# Patient Record
Sex: MALE | Race: WHITE | NOT HISPANIC OR LATINO | Employment: FULL TIME | URBAN - METROPOLITAN AREA
[De-identification: names, ages, dates, MRNs, and addresses within clinical notes are randomized per-mention and may not be internally consistent; named-entity substitution may affect disease eponyms.]

---

## 2024-09-09 ENCOUNTER — HOSPITAL ENCOUNTER (INPATIENT)
Facility: HOSPITAL | Age: 39
End: 2024-09-09
Admitting: SURGERY
Payer: MEDICARE

## 2024-09-09 ENCOUNTER — APPOINTMENT (OUTPATIENT)
Dept: RADIOLOGY | Facility: HOSPITAL | Age: 39
End: 2024-09-09
Payer: MEDICARE

## 2024-09-09 DIAGNOSIS — R10.84 GENERALIZED ABDOMINAL PAIN: Primary | ICD-10-CM

## 2024-09-09 DIAGNOSIS — K43.6 VENTRAL HERNIA WITH OBSTRUCTION AND WITHOUT GANGRENE: ICD-10-CM

## 2024-09-09 DIAGNOSIS — K56.609 SMALL BOWEL OBSTRUCTION (MULTI): ICD-10-CM

## 2024-09-09 LAB
ALBUMIN SERPL-MCNC: 5.1 G/DL (ref 3.5–5)
ALP BLD-CCNC: 134 U/L (ref 35–125)
ALT SERPL-CCNC: 59 U/L (ref 5–40)
ANION GAP SERPL CALC-SCNC: 18 MMOL/L
AST SERPL-CCNC: 53 U/L (ref 5–40)
BASOPHILS # BLD AUTO: 0.06 X10*3/UL (ref 0–0.1)
BASOPHILS NFR BLD AUTO: 0.4 %
BILIRUB SERPL-MCNC: 0.9 MG/DL (ref 0.1–1.2)
BUN SERPL-MCNC: 21 MG/DL (ref 8–25)
CALCIUM SERPL-MCNC: 11.1 MG/DL (ref 8.5–10.4)
CHLORIDE SERPL-SCNC: 92 MMOL/L (ref 97–107)
CO2 SERPL-SCNC: 26 MMOL/L (ref 24–31)
CREAT SERPL-MCNC: 1.7 MG/DL (ref 0.4–1.6)
EGFRCR SERPLBLD CKD-EPI 2021: 52 ML/MIN/1.73M*2
EOSINOPHIL # BLD AUTO: 0.01 X10*3/UL (ref 0–0.7)
EOSINOPHIL NFR BLD AUTO: 0.1 %
ERYTHROCYTE [DISTWIDTH] IN BLOOD BY AUTOMATED COUNT: 12.3 % (ref 11.5–14.5)
GLUCOSE SERPL-MCNC: 178 MG/DL (ref 65–99)
HCT VFR BLD AUTO: 52 % (ref 41–52)
HGB BLD-MCNC: 17.7 G/DL (ref 13.5–17.5)
IMM GRANULOCYTES # BLD AUTO: 0.04 X10*3/UL (ref 0–0.7)
IMM GRANULOCYTES NFR BLD AUTO: 0.3 % (ref 0–0.9)
LACTATE BLDV-SCNC: 1.8 MMOL/L (ref 0.4–2)
LACTATE BLDV-SCNC: 3 MMOL/L (ref 0.4–2)
LIPASE SERPL-CCNC: 40 U/L (ref 16–63)
LYMPHOCYTES # BLD AUTO: 1 X10*3/UL (ref 1.2–4.8)
LYMPHOCYTES NFR BLD AUTO: 7.3 %
MCH RBC QN AUTO: 29.4 PG (ref 26–34)
MCHC RBC AUTO-ENTMCNC: 34 G/DL (ref 32–36)
MCV RBC AUTO: 86 FL (ref 80–100)
MONOCYTES # BLD AUTO: 0.91 X10*3/UL (ref 0.1–1)
MONOCYTES NFR BLD AUTO: 6.6 %
NEUTROPHILS # BLD AUTO: 11.75 X10*3/UL (ref 1.2–7.7)
NEUTROPHILS NFR BLD AUTO: 85.3 %
NRBC BLD-RTO: 0 /100 WBCS (ref 0–0)
PLATELET # BLD AUTO: 306 X10*3/UL (ref 150–450)
POTASSIUM SERPL-SCNC: 4.5 MMOL/L (ref 3.4–5.1)
PROT SERPL-MCNC: 10.2 G/DL (ref 5.9–7.9)
RBC # BLD AUTO: 6.02 X10*6/UL (ref 4.5–5.9)
SODIUM SERPL-SCNC: 136 MMOL/L (ref 133–145)
WBC # BLD AUTO: 13.8 X10*3/UL (ref 4.4–11.3)

## 2024-09-09 PROCEDURE — 2500000004 HC RX 250 GENERAL PHARMACY W/ HCPCS (ALT 636 FOR OP/ED): Performed by: PHYSICIAN ASSISTANT

## 2024-09-09 PROCEDURE — 83605 ASSAY OF LACTIC ACID: CPT | Performed by: PHYSICIAN ASSISTANT

## 2024-09-09 PROCEDURE — 85025 COMPLETE CBC W/AUTO DIFF WBC: CPT | Performed by: PHYSICIAN ASSISTANT

## 2024-09-09 PROCEDURE — 80053 COMPREHEN METABOLIC PANEL: CPT | Performed by: PHYSICIAN ASSISTANT

## 2024-09-09 PROCEDURE — 36415 COLL VENOUS BLD VENIPUNCTURE: CPT | Performed by: PHYSICIAN ASSISTANT

## 2024-09-09 PROCEDURE — 96374 THER/PROPH/DIAG INJ IV PUSH: CPT

## 2024-09-09 PROCEDURE — 99285 EMERGENCY DEPT VISIT HI MDM: CPT | Mod: 25

## 2024-09-09 PROCEDURE — 74177 CT ABD & PELVIS W/CONTRAST: CPT | Performed by: RADIOLOGY

## 2024-09-09 PROCEDURE — 74177 CT ABD & PELVIS W/CONTRAST: CPT

## 2024-09-09 PROCEDURE — 96361 HYDRATE IV INFUSION ADD-ON: CPT

## 2024-09-09 PROCEDURE — 96375 TX/PRO/DX INJ NEW DRUG ADDON: CPT

## 2024-09-09 PROCEDURE — 2550000001 HC RX 255 CONTRASTS

## 2024-09-09 PROCEDURE — 83690 ASSAY OF LIPASE: CPT | Performed by: PHYSICIAN ASSISTANT

## 2024-09-09 RX ORDER — ONDANSETRON HYDROCHLORIDE 2 MG/ML
4 INJECTION, SOLUTION INTRAVENOUS ONCE
Status: COMPLETED | OUTPATIENT
Start: 2024-09-09 | End: 2024-09-09

## 2024-09-09 RX ORDER — MORPHINE SULFATE 4 MG/ML
4 INJECTION, SOLUTION INTRAMUSCULAR; INTRAVENOUS ONCE
Status: COMPLETED | OUTPATIENT
Start: 2024-09-09 | End: 2024-09-09

## 2024-09-09 ASSESSMENT — LIFESTYLE VARIABLES
TOTAL SCORE: 0
HAVE PEOPLE ANNOYED YOU BY CRITICIZING YOUR DRINKING: NO
EVER HAD A DRINK FIRST THING IN THE MORNING TO STEADY YOUR NERVES TO GET RID OF A HANGOVER: NO
EVER FELT BAD OR GUILTY ABOUT YOUR DRINKING: NO
HAVE YOU EVER FELT YOU SHOULD CUT DOWN ON YOUR DRINKING: NO

## 2024-09-09 ASSESSMENT — PAIN - FUNCTIONAL ASSESSMENT: PAIN_FUNCTIONAL_ASSESSMENT: 0-10

## 2024-09-09 ASSESSMENT — PAIN SCALES - GENERAL
PAINLEVEL_OUTOF10: 8
PAINLEVEL_OUTOF10: 7

## 2024-09-09 ASSESSMENT — PAIN DESCRIPTION - LOCATION: LOCATION: ABDOMEN

## 2024-09-09 ASSESSMENT — COLUMBIA-SUICIDE SEVERITY RATING SCALE - C-SSRS
2. HAVE YOU ACTUALLY HAD ANY THOUGHTS OF KILLING YOURSELF?: NO
1. IN THE PAST MONTH, HAVE YOU WISHED YOU WERE DEAD OR WISHED YOU COULD GO TO SLEEP AND NOT WAKE UP?: NO
6. HAVE YOU EVER DONE ANYTHING, STARTED TO DO ANYTHING, OR PREPARED TO DO ANYTHING TO END YOUR LIFE?: NO

## 2024-09-09 ASSESSMENT — PAIN DESCRIPTION - PAIN TYPE: TYPE: ACUTE PAIN

## 2024-09-10 ENCOUNTER — APPOINTMENT (OUTPATIENT)
Dept: RADIOLOGY | Facility: HOSPITAL | Age: 39
End: 2024-09-10
Payer: MEDICARE

## 2024-09-10 PROBLEM — R10.84 GENERALIZED ABDOMINAL PAIN: Status: ACTIVE | Noted: 2024-09-09

## 2024-09-10 PROBLEM — K56.609 SMALL BOWEL OBSTRUCTION (MULTI): Status: ACTIVE | Noted: 2024-09-10

## 2024-09-10 PROBLEM — K43.6 VENTRAL HERNIA WITH OBSTRUCTION AND WITHOUT GANGRENE: Status: ACTIVE | Noted: 2024-09-09

## 2024-09-10 LAB
APPEARANCE UR: ABNORMAL
BILIRUB UR STRIP.AUTO-MCNC: NEGATIVE MG/DL
COLOR UR: YELLOW
GLUCOSE UR STRIP.AUTO-MCNC: NORMAL MG/DL
HOLD SPECIMEN: NORMAL
KETONES UR STRIP.AUTO-MCNC: NEGATIVE MG/DL
LEUKOCYTE ESTERASE UR QL STRIP.AUTO: NEGATIVE
NITRITE UR QL STRIP.AUTO: NEGATIVE
PH UR STRIP.AUTO: 6 [PH]
PROT UR STRIP.AUTO-MCNC: ABNORMAL MG/DL
RBC # UR STRIP.AUTO: NEGATIVE /UL
RBC #/AREA URNS AUTO: NORMAL /HPF
SP GR UR STRIP.AUTO: >1.05
UROBILINOGEN UR STRIP.AUTO-MCNC: NORMAL MG/DL
WBC #/AREA URNS AUTO: NORMAL /HPF

## 2024-09-10 PROCEDURE — 74018 RADEX ABDOMEN 1 VIEW: CPT

## 2024-09-10 PROCEDURE — 76705 ECHO EXAM OF ABDOMEN: CPT | Performed by: STUDENT IN AN ORGANIZED HEALTH CARE EDUCATION/TRAINING PROGRAM

## 2024-09-10 PROCEDURE — G0378 HOSPITAL OBSERVATION PER HR: HCPCS

## 2024-09-10 PROCEDURE — 76705 ECHO EXAM OF ABDOMEN: CPT

## 2024-09-10 PROCEDURE — 2500000004 HC RX 250 GENERAL PHARMACY W/ HCPCS (ALT 636 FOR OP/ED)

## 2024-09-10 PROCEDURE — 74018 RADEX ABDOMEN 1 VIEW: CPT | Performed by: RADIOLOGY

## 2024-09-10 PROCEDURE — 99223 1ST HOSP IP/OBS HIGH 75: CPT

## 2024-09-10 PROCEDURE — 96361 HYDRATE IV INFUSION ADD-ON: CPT

## 2024-09-10 PROCEDURE — 81001 URINALYSIS AUTO W/SCOPE: CPT | Performed by: PHYSICIAN ASSISTANT

## 2024-09-10 RX ORDER — IBUPROFEN 200 MG
200 TABLET ORAL EVERY 6 HOURS PRN
COMMUNITY

## 2024-09-10 RX ORDER — HYDROMORPHONE HYDROCHLORIDE 1 MG/ML
1 INJECTION, SOLUTION INTRAMUSCULAR; INTRAVENOUS; SUBCUTANEOUS
Status: DISCONTINUED | OUTPATIENT
Start: 2024-09-10 | End: 2024-09-15

## 2024-09-10 RX ORDER — SODIUM CHLORIDE 9 MG/ML
100 INJECTION, SOLUTION INTRAVENOUS CONTINUOUS
Status: DISCONTINUED | OUTPATIENT
Start: 2024-09-10 | End: 2024-09-10

## 2024-09-10 RX ORDER — ONDANSETRON HYDROCHLORIDE 2 MG/ML
4 INJECTION, SOLUTION INTRAVENOUS EVERY 6 HOURS PRN
Status: DISCONTINUED | OUTPATIENT
Start: 2024-09-10 | End: 2024-09-20 | Stop reason: HOSPADM

## 2024-09-10 RX ORDER — HYDROMORPHONE HYDROCHLORIDE 1 MG/ML
0.6 INJECTION, SOLUTION INTRAMUSCULAR; INTRAVENOUS; SUBCUTANEOUS
Status: DISCONTINUED | OUTPATIENT
Start: 2024-09-10 | End: 2024-09-15

## 2024-09-10 RX ORDER — GLUCOSAM/CHONDRO/HERB 149/HYAL 750-100 MG
1 TABLET ORAL DAILY
COMMUNITY

## 2024-09-10 RX ORDER — SODIUM CHLORIDE 450 MG/100ML
100 INJECTION, SOLUTION INTRAVENOUS CONTINUOUS
Status: DISCONTINUED | OUTPATIENT
Start: 2024-09-10 | End: 2024-09-12

## 2024-09-10 SDOH — SOCIAL STABILITY: SOCIAL INSECURITY: HAS ANYONE EVER THREATENED TO HURT YOUR FAMILY OR YOUR PETS?: NO

## 2024-09-10 SDOH — ECONOMIC STABILITY: INCOME INSECURITY: IN THE LAST 12 MONTHS, WAS THERE A TIME WHEN YOU WERE NOT ABLE TO PAY THE MORTGAGE OR RENT ON TIME?: NO

## 2024-09-10 SDOH — ECONOMIC STABILITY: HOUSING INSECURITY: IN THE PAST 12 MONTHS, HOW MANY TIMES HAVE YOU MOVED WHERE YOU WERE LIVING?: 0

## 2024-09-10 SDOH — SOCIAL STABILITY: SOCIAL INSECURITY: ARE YOU OR HAVE YOU BEEN THREATENED OR ABUSED PHYSICALLY, EMOTIONALLY, OR SEXUALLY BY ANYONE?: NO

## 2024-09-10 SDOH — ECONOMIC STABILITY: HOUSING INSECURITY: AT ANY TIME IN THE PAST 12 MONTHS, WERE YOU HOMELESS OR LIVING IN A SHELTER (INCLUDING NOW)?: NO

## 2024-09-10 SDOH — SOCIAL STABILITY: SOCIAL INSECURITY: ARE THERE ANY APPARENT SIGNS OF INJURIES/BEHAVIORS THAT COULD BE RELATED TO ABUSE/NEGLECT?: NO

## 2024-09-10 SDOH — SOCIAL STABILITY: SOCIAL INSECURITY: HAVE YOU HAD ANY THOUGHTS OF HARMING ANYONE ELSE?: NO

## 2024-09-10 SDOH — ECONOMIC STABILITY: INCOME INSECURITY: HOW HARD IS IT FOR YOU TO PAY FOR THE VERY BASICS LIKE FOOD, HOUSING, MEDICAL CARE, AND HEATING?: SOMEWHAT HARD

## 2024-09-10 SDOH — SOCIAL STABILITY: SOCIAL INSECURITY: DOES ANYONE TRY TO KEEP YOU FROM HAVING/CONTACTING OTHER FRIENDS OR DOING THINGS OUTSIDE YOUR HOME?: NO

## 2024-09-10 SDOH — ECONOMIC STABILITY: TRANSPORTATION INSECURITY
IN THE PAST 12 MONTHS, HAS LACK OF TRANSPORTATION KEPT YOU FROM MEETINGS, WORK, OR FROM GETTING THINGS NEEDED FOR DAILY LIVING?: NO

## 2024-09-10 SDOH — ECONOMIC STABILITY: TRANSPORTATION INSECURITY
IN THE PAST 12 MONTHS, HAS THE LACK OF TRANSPORTATION KEPT YOU FROM MEDICAL APPOINTMENTS OR FROM GETTING MEDICATIONS?: NO

## 2024-09-10 SDOH — SOCIAL STABILITY: SOCIAL INSECURITY: WERE YOU ABLE TO COMPLETE ALL THE BEHAVIORAL HEALTH SCREENINGS?: YES

## 2024-09-10 SDOH — SOCIAL STABILITY: SOCIAL INSECURITY: ABUSE: ADULT

## 2024-09-10 SDOH — SOCIAL STABILITY: SOCIAL INSECURITY: HAVE YOU HAD THOUGHTS OF HARMING ANYONE ELSE?: NO

## 2024-09-10 SDOH — SOCIAL STABILITY: SOCIAL INSECURITY: DO YOU FEEL UNSAFE GOING BACK TO THE PLACE WHERE YOU ARE LIVING?: NO

## 2024-09-10 SDOH — SOCIAL STABILITY: SOCIAL INSECURITY: DO YOU FEEL ANYONE HAS EXPLOITED OR TAKEN ADVANTAGE OF YOU FINANCIALLY OR OF YOUR PERSONAL PROPERTY?: NO

## 2024-09-10 ASSESSMENT — LIFESTYLE VARIABLES
HOW OFTEN DO YOU HAVE 6 OR MORE DRINKS ON ONE OCCASION: NEVER
PRESCIPTION_ABUSE_PAST_12_MONTHS: NO
SKIP TO QUESTIONS 9-10: 1
SUBSTANCE_ABUSE_PAST_12_MONTHS: NO
HOW OFTEN DO YOU HAVE A DRINK CONTAINING ALCOHOL: NEVER
HOW MANY STANDARD DRINKS CONTAINING ALCOHOL DO YOU HAVE ON A TYPICAL DAY: PATIENT DOES NOT DRINK
AUDIT-C TOTAL SCORE: 0
AUDIT-C TOTAL SCORE: 0

## 2024-09-10 ASSESSMENT — COGNITIVE AND FUNCTIONAL STATUS - GENERAL
PATIENT BASELINE BEDBOUND: NO
MOBILITY SCORE: 24
DAILY ACTIVITIY SCORE: 24

## 2024-09-10 ASSESSMENT — ENCOUNTER SYMPTOMS
HEMATOLOGIC/LYMPHATIC NEGATIVE: 1
MUSCULOSKELETAL NEGATIVE: 1
DIARRHEA: 0
FEVER: 0
ABDOMINAL DISTENTION: 1
CONSTIPATION: 0
NEUROLOGICAL NEGATIVE: 1
COUGH: 0
SHORTNESS OF BREATH: 0
VOMITING: 1
ANAL BLEEDING: 0
PSYCHIATRIC NEGATIVE: 1
BLOOD IN STOOL: 0
CHILLS: 0
NAUSEA: 1
ABDOMINAL PAIN: 1
RECTAL PAIN: 0

## 2024-09-10 ASSESSMENT — ACTIVITIES OF DAILY LIVING (ADL)
ADEQUATE_TO_COMPLETE_ADL: YES
PATIENT'S MEMORY ADEQUATE TO SAFELY COMPLETE DAILY ACTIVITIES?: YES
JUDGMENT_ADEQUATE_SAFELY_COMPLETE_DAILY_ACTIVITIES: YES
HEARING - LEFT EAR: FUNCTIONAL
DRESSING YOURSELF: INDEPENDENT
HEARING - RIGHT EAR: FUNCTIONAL
BATHING: INDEPENDENT
GROOMING: INDEPENDENT
FEEDING YOURSELF: INDEPENDENT
WALKS IN HOME: INDEPENDENT
TOILETING: INDEPENDENT

## 2024-09-10 ASSESSMENT — PATIENT HEALTH QUESTIONNAIRE - PHQ9
2. FEELING DOWN, DEPRESSED OR HOPELESS: NOT AT ALL
SUM OF ALL RESPONSES TO PHQ9 QUESTIONS 1 & 2: 1
1. LITTLE INTEREST OR PLEASURE IN DOING THINGS: SEVERAL DAYS

## 2024-09-10 ASSESSMENT — PAIN SCALES - GENERAL
PAINLEVEL_OUTOF10: 0 - NO PAIN
PAINLEVEL_OUTOF10: 0 - NO PAIN
PAINLEVEL_OUTOF10: 9

## 2024-09-10 NOTE — H&P
History Of Present Illness  Patricio Ayers is a 39 y.o. male with a past history of ventral hernia with bowel obstruction requiring repair, borderline type II diabetic, SURJIT,  presenting with abdominal pain that started early this morning.  He reports the pain is generalized throughout his abdomen with associated nausea and vomiting.  Patient denies any issues with constipation or diarrhea but does state that he has not had any solid food in about 5 days.  CT A/P demonstrated dilated loops of small bowel with a transition point in the right mid abdomen as well as a supraumbilical ventral abdominal wall hernia containing small bowel and fat.  Also noted was cholelithiasis with some mild gallbladder wall thickening.     Past Medical History  Borderline type II diabetic  Small bowel obstruction  Acute kidney injury    Surgical History  Ventral hernia repair     Social History  No tobacco use  No drug use  Occasional alcohol use    Family History  Coronary artery disease  Diabetes mellitus     Allergies  Patient has no known allergies.    Review of Systems   Constitutional:  Negative for chills and fever.   Respiratory:  Negative for cough and shortness of breath.    Cardiovascular:  Negative for chest pain.   Gastrointestinal:  Positive for abdominal distention, abdominal pain, nausea and vomiting. Negative for anal bleeding, blood in stool, constipation, diarrhea and rectal pain.   Genitourinary: Negative.    Musculoskeletal: Negative.    Skin: Negative.    Neurological: Negative.    Hematological: Negative.    Psychiatric/Behavioral: Negative.          Physical Exam  Vitals and nursing note reviewed.   Constitutional:       Appearance: Normal appearance.   HENT:      Head: Normocephalic and atraumatic.   Cardiovascular:      Rate and Rhythm: Normal rate and regular rhythm.   Pulmonary:      Effort: Pulmonary effort is normal. No respiratory distress.   Abdominal:      General: There is distension.      Palpations:  "Abdomen is soft.      Tenderness: There is generalized abdominal tenderness and tenderness in the epigastric area.   Musculoskeletal:         General: No swelling or tenderness.   Skin:     General: Skin is warm and dry.   Neurological:      Mental Status: He is alert and oriented to person, place, and time.          Last Recorded Vitals  Blood pressure 130/89, pulse 72, temperature 36.6 °C (97.9 °F), temperature source Temporal, resp. rate 16, height 1.88 m (6' 2\"), weight 118 kg (260 lb), SpO2 (!) 92%.    Relevant Results  Lab Results   Component Value Date    WBC 13.8 (H) 09/09/2024    HGB 17.7 (H) 09/09/2024    HCT 52.0 09/09/2024    MCV 86 09/09/2024     09/09/2024     Lab Results   Component Value Date    GLUCOSE 178 (H) 09/09/2024    CALCIUM 11.1 (H) 09/09/2024     09/09/2024    K 4.5 09/09/2024    CO2 26 09/09/2024    CL 92 (L) 09/09/2024    BUN 21 09/09/2024    CREATININE 1.70 (H) 09/09/2024     Lab Results   Component Value Date    ALT 59 (H) 09/09/2024    AST 53 (H) 09/09/2024    ALKPHOS 134 (H) 09/09/2024    BILITOT 0.9 09/09/2024       === 09/09/24 ===    CT ABDOMEN PELVIS W IV CONTRAST    - Impression -  Dilated loops of small bowel throughout the abdomen and pelvis with a  transition point in the right mid abdomen compatible with small bowel  obstruction. Distal small bowel and colon is decompressed concerning  for complete obstruction. A portion of small bowel extends into a  supraumbilical ventral abdominal wall hernia defect, although this  does not appear to be the point of transition.    Supraumbilical ventral abdominal wall hernias containing fat as well  as a portion of small bowel.    Cholelithiasis with mild gallbladder wall thickening. Correlate for  clinical concern of cholecystitis.    MACRO:  None.    Signed by: Evan Finkelstein 9/10/2024 12:31 AM  Dictation workstation:   [id number]       Assessment/Plan   Assessment & Plan  Small bowel obstruction (Multi)  Patient with " past history of ventral hernia causing bowel obstruction in 2023 requiring surgical repair.  NG tube inserted overnight.  Patient accidentally removed tube at some point in the emergency department.  I replaced the tube.  Output is thin, dark green.   Continue NG tube to low intermittent wall suction.  N.p.o., ice chips okay.  Generalized abdominal pain  There is a bowel containing hernia with a transition point in the mid abdomen causing complete small bowel obstruction- patient has been placed on the schedule for tomorrow for a hernia repair, but we will give him some time to decompress with NG tube today.  Would also like to further evaluate the possibility of cholecystitis with evidence of cholelithiasis and gallbladder wall thickening on imaging,  Liver enzymes slightly elevated.  Ultrasound of the gallbladder is ordered and pending to be completed.  Ventral hernia with obstruction and without gangrene      Patient also presents with SURJIT, creatinine more than double his baseline. He hasn't had much PO over the past few days. He did receive 1L NS bolus in ER. Will order 1 additional NS 1L Bolus followed by maintenance fluids. Recheck kidney function in AM.     Blood glucose checks every 4 hours while NPO.  Will hold off on empiric antibiotics for now. Prophylactic abx on call to OR.   Zofran prn  Pain management  IVF fluids    Further recommendations based on discussion with Dr. La.    I spent 60 minutes in the professional and overall care of this patient.      Laurent Coombs, APRN-CNP

## 2024-09-10 NOTE — TREATMENT PLAN
"Patient signed out to me by off going provider, Dr. Yovani Jimenez, at 5:04 PM in stable condition.    IN BRIEF:  39 y.o.male // pmhx hernia // p/w abdominal pain with associated emesis  - SBO  - Potential cholecystitis    /81 (Patient Position: Sitting)   Pulse (!) 115   Temp 36.6 °C (97.9 °F) (Temporal)   Resp 18   Ht 1.88 m (6' 2\")   Wt 115 kg (254 lb 6.4 oz)   SpO2 96%   BMI 32.66 kg/m²     ED Course as of 09/13/24 1704   Tue Sep 10, 2024   1130 US gallbladder  IMPRESSION:  Dirty shadowing within contracted gallbladder compatible with gas as  shown on yesterday's CT. No definite evidence of acute cholecystitis.  Of note, apparent gallbladder-duodenal fistula demonstrated on the  yesterday's CT, new compared to more remote CT dated 02/11/2023 and  11/14/2022. This is presumably related to chronic cholecystitis or  possible prior gallstone erosion forming chronic fistulization with  adjacent proximal duodenum. No stones identified within the bowel on  yesterday's CT. Consider surgical consultation.      Enlarged steatotic liver.   [BC]      ED Course User Index  [BC] Gideon Snyder MD         Diagnoses as of 09/13/24 1704   Generalized abdominal pain       Remaining work up:  Images US RUQ, Reassessment, and Call report to Gen Surge vs Medicine    Likely disposition Gen Surgery versus Medicine Admission with alternative NONE.    Physical Exam  Vitals and nursing note reviewed.   Constitutional:       Appearance: Normal appearance. He is normal weight.   HENT:      Mouth/Throat:      Mouth: Mucous membranes are moist.   Eyes:      Pupils: Pupils are equal, round, and reactive to light.   Cardiovascular:      Rate and Rhythm: Normal rate and regular rhythm.      Pulses: Normal pulses.   Pulmonary:      Effort: Pulmonary effort is normal.      Breath sounds: Normal breath sounds.   Abdominal:      General: Abdomen is protuberant.      Palpations: Abdomen is soft.      Tenderness: There is abdominal " tenderness in the right upper quadrant and epigastric area.   Skin:     General: Skin is warm and dry.   Neurological:      General: No focal deficit present.      Mental Status: He is alert and oriented to person, place, and time.         TRANSITION of CARE INTERVAL:  On reassessment patient still experiencing epigastric discomfort and arm physical exam still experiencing RUQ/epigastric tenderness but no significant pain out of proportion from exam.  No appreciable abdominal distention/increased tympany.  Informed that findings likely chronic cholecystitis given fistula with duodenal region but no definitive cholecystitis.  Continue expectant management of symptoms with plan for surgical intervention of identified SBO.      FINAL IMPRESSION:  1. Generalized abdominal pain  Case Request Operating Room: Repair Ventral Hernia Laparoscopy    Case Request Operating Room: Repair Ventral Hernia Laparoscopy    Surgical Pathology Exam    Surgical Pathology Exam      2. Ventral hernia with obstruction and without gangrene  Case Request Operating Room: Repair Ventral Hernia Laparoscopy    Case Request Operating Room: Repair Ventral Hernia Laparoscopy    Surgical Pathology Exam    Surgical Pathology Exam            FINAL DISPOSITION:  Admission for surgical intervention of identified SBO

## 2024-09-10 NOTE — ED PROVIDER NOTES
HPI   Chief Complaint   Patient presents with    Abdominal Pain     Patient states since this past Saturday intermittent mid periumbillical abdominal pain.  Patient states did have 2 episodes of vomiting today and once yesterday.  Denies diarrhea or constipation.        Patient is a 39-year-old male who presents with a chief complaint of abdominal pain.  Patient's has had progressively 3 days of worsening abdominal pain.  He states that his skin coming and going.  Patient states he is never had a pain like this before, patient states he has had a prior history of a hernia that has been repaired.  Patient has had normal bowel movements for himself, has had no fevers, has had nausea and vomiting, no chest pain or shortness of breath, no dizziness or blurred vision.      History provided by:  Patient          Patient History   No past medical history on file.  No past surgical history on file.  No family history on file.  Social History     Tobacco Use    Smoking status: Not on file    Smokeless tobacco: Not on file   Substance Use Topics    Alcohol use: Not on file    Drug use: Not on file       Physical Exam   ED Triage Vitals [09/09/24 2025]   Temperature Heart Rate Respirations BP   36.6 °C (97.9 °F) (!) 101 16 (!) 133/94      Pulse Ox Temp Source Heart Rate Source Patient Position   99 % Temporal Monitor Sitting      BP Location FiO2 (%)     Left arm --       Physical Exam  Vitals and nursing note reviewed. Exam conducted with a chaperone present.   Constitutional:       General: He is not in acute distress.     Appearance: He is well-developed and normal weight. He is not ill-appearing, toxic-appearing or diaphoretic.   HENT:      Head: Normocephalic and atraumatic.      Mouth/Throat:      Mouth: Mucous membranes are moist.      Pharynx: Oropharynx is clear.   Eyes:      Extraocular Movements: Extraocular movements intact.      Pupils: Pupils are equal, round, and reactive to light.   Cardiovascular:      Rate  and Rhythm: Normal rate and regular rhythm.      Heart sounds: Normal heart sounds.   Pulmonary:      Effort: Pulmonary effort is normal.      Breath sounds: Normal breath sounds.   Abdominal:      General: Abdomen is flat. Bowel sounds are normal.      Palpations: Abdomen is soft.      Tenderness: There is generalized abdominal tenderness.   Skin:     General: Skin is warm and dry.      Capillary Refill: Capillary refill takes less than 2 seconds.   Neurological:      General: No focal deficit present.      Mental Status: He is alert and oriented to person, place, and time.   Psychiatric:         Mood and Affect: Mood normal.         Behavior: Behavior normal.           ED Course & MDM   Diagnoses as of 09/09/24 2251   Generalized abdominal pain                 No data recorded     Everett Coma Scale Score: 15 (09/09/24 2026 : Harini Thrasher RN)                           Medical Decision Making  Patient seen and evaluated at bedside, patient is in no acute distress.  I will order a CBC, CMP, lipase, CT abdomen pelvis, 4 mg Zofran, 4 mg morphine, 1 L normal saline. Differential diagnosis includes but is not limited to pancreatitis, small bowel obstruction, gastritis, viral gastroenteritis..  Patient CBC shows white blood cell count 13.8, CMP shows transaminitis, patient CT imaging this below, we will add on a ultrasound.  Patient had NG tube placed.  Patient was signed out to oncoming clinician at the end of my shift.XR abdomen 1 view   Final Result    NG tube as above.          MACRO:    None.          Signed by: Luz Dangelo 9/10/2024 11:19 AM    Dictation workstation:   [id number]     US gallbladder   Final Result    Dirty shadowing within contracted gallbladder compatible with gas as    shown on yesterday's CT. No definite evidence of acute cholecystitis.    Of note, apparent gallbladder-duodenal fistula demonstrated on the    yesterday's CT, new compared to more remote CT dated 02/11/2023 and    11/14/2022.  This is presumably related to chronic cholecystitis or    possible prior gallstone erosion forming chronic fistulization with    adjacent proximal duodenum. No stones identified within the bowel on    yesterday's CT. Consider surgical consultation.          Enlarged steatotic liver.          MACRO:    Celestina Dennison discussed the significance and urgency of this    critical finding by telephone with Dr. Snyder in absence of KHARI PENNINGTON on 9/10/2024 at 10:53 am.  (**-RCF-**) Findings:  See findings.                      Signed by: Celestina Dennison 9/10/2024 11:11 AM    Dictation workstation:   [id number]     XR abdomen 1 view   Final Result    Nasogastric tube tip terminates in the left upper abdomen at the    level of the proximal stomach.          MACRO:    None          Signed by: Bryan Ellis 9/10/2024 4:03 AM    Dictation workstation:   [id number]     CT abdomen pelvis w IV contrast   Final Result    Dilated loops of small bowel throughout the abdomen and pelvis with a    transition point in the right mid abdomen compatible with small bowel    obstruction. Distal small bowel and colon is decompressed concerning    for complete obstruction. A portion of small bowel extends into a    supraumbilical ventral abdominal wall hernia defect, although this    does not appear to be the point of transition.          Supraumbilical ventral abdominal wall hernias containing fat as well    as a portion of small bowel.          Cholelithiasis with mild gallbladder wall thickening. Correlate for    clinical concern of cholecystitis.          MACRO:    None.          Signed by: Evan Finkelstein 9/10/2024 12:31 AM    Dictation workstation:   [id number]     Results for orders placed or performed during the hospital encounter of 09/09/24  -CBC and Auto Differential:        Result                      Value             Ref Range           WBC                         13.8 (H)          4.4 - 11.3 x*       nRBC                         0.0               0.0 - 0.0 /1*       RBC                         6.02 (H)          4.50 - 5.90 *       Hemoglobin                  17.7 (H)          13.5 - 17.5 *       Hematocrit                  52.0              41.0 - 52.0 %       MCV                         86                80 - 100 fL         MCH                         29.4              26.0 - 34.0 *       MCHC                        34.0              32.0 - 36.0 *       RDW                         12.3              11.5 - 14.5 %       Platelets                   306               150 - 450 x1*       Neutrophils %               85.3              40.0 - 80.0 %       Immature Granulocytes *     0.3               0.0 - 0.9 %         Lymphocytes %               7.3               13.0 - 44.0 %       Monocytes %                 6.6               2.0 - 10.0 %        Eosinophils %               0.1               0.0 - 6.0 %         Basophils %                 0.4               0.0 - 2.0 %         Neutrophils Absolute        11.75 (H)         1.20 - 7.70 *       Immature Granulocytes *     0.04              0.00 - 0.70 *       Lymphocytes Absolute        1.00 (L)          1.20 - 4.80 *       Monocytes Absolute          0.91              0.10 - 1.00 *       Eosinophils Absolute        0.01              0.00 - 0.70 *       Basophils Absolute          0.06              0.00 - 0.10 *  -Comprehensive metabolic panel:        Result                      Value             Ref Range           Glucose                     178 (H)           65 - 99 mg/dL       Sodium                      136               133 - 145 mm*       Potassium                   4.5               3.4 - 5.1 mm*       Chloride                    92 (L)            97 - 107 mmo*       Bicarbonate                 26                24 - 31 mmol*       Urea Nitrogen               21                8 - 25 mg/dL        Creatinine                  1.70 (H)          0.40 - 1.60 *       eGFR                         52 (L)            >60 mL/min/1*       Calcium                     11.1 (H)          8.5 - 10.4 m*       Albumin                     5.1 (H)           3.5 - 5.0 g/*       Alkaline Phosphatase        134 (H)           35 - 125 U/L        Total Protein               10.2 (H)          5.9 - 7.9 g/*       AST                         53 (H)            5 - 40 U/L          Bilirubin, Total            0.9               0.1 - 1.2 mg*       ALT                         59 (H)            5 - 40 U/L          Anion Gap                   18                <=19 mmol/L    -Lipase:        Result                      Value             Ref Range           Lipase                      40                16 - 63 U/L    -Blood Gas Lactic Acid, Venous:        Result                      Value             Ref Range           POCT Lactate, Venous        3.0 (H)           0.4 - 2.0 mm*  -Urinalysis with Reflex Culture and Microscopic:        Result                      Value             Ref Range           Color, Urine                Yellow            Light-Yellow*       Appearance, Urine           Turbid (N)        Clear               Specific Gravity, Urine     >1.050 (N)        1.005 - 1.035       pH, Urine                   6.0               5.0, 5.5, 6.*       Protein, Urine              50 (1+) (A)       NEGATIVE, 10*       Glucose, Urine              Normal            Normal mg/dL        Blood, Urine                NEGATIVE          NEGATIVE            Ketones, Urine              NEGATIVE          NEGATIVE mg/*       Bilirubin, Urine            NEGATIVE          NEGATIVE            Urobilinogen, Urine         Normal            Normal mg/dL        Nitrite, Urine              NEGATIVE          NEGATIVE            Leukocyte Esterase, Ur*     NEGATIVE          NEGATIVE       -Extra Urine Gray Tube:        Result                      Value             Ref Range           Extra Tube                                                    Hold  for add-ons.  -Blood Gas Lactic Acid, Venous:        Result                      Value             Ref Range           POCT Lactate, Venous        1.8               0.4 - 2.0 mm*  -Urinalysis Microscopic:        Result                      Value             Ref Range           WBC, Urine                  NONE              1-5, NONE /H*       RBC, Urine                  NONE              NONE, 1-2, 3*          Procedure  Procedures    Sections of this report were created using voice-to-text technology and may contain errors in translation    Yovani Jimenez DO  Emergency Medicine         Yovani Jimenez DO  09/11/24 0129

## 2024-09-10 NOTE — ASSESSMENT & PLAN NOTE
Patient with past history of ventral hernia causing bowel obstruction in 2023 requiring surgical repair.

## 2024-09-10 NOTE — ASSESSMENT & PLAN NOTE
Patient with past history of ventral hernia causing bowel obstruction in 2023 requiring surgical repair.  NG tube inserted overnight.  Patient accidentally removed tube at some point in the emergency department.  I replaced the tube.  Output is thin, dark green.   Continue NG tube to low intermittent wall suction.  N.p.o., ice chips okay.

## 2024-09-10 NOTE — NURSING NOTE
Admitted to 402[facility], denies pain, NG to LIWS working without difficulty, IV fluids infusing, mother at bedside

## 2024-09-10 NOTE — PROGRESS NOTES
Pharmacy Medication History Review    Patricio Ayers is a 39 y.o. male admitted for Small bowel obstruction (Multi). Pharmacy reviewed the patient's [id number] medications and allergies for accuracy.    Medications ADDED:  Ibuprofen 200mg  Fish oil  Vitamin D3 2000Unit  Medications CHANGED:  none  Medications REMOVED:   None      The list below reflects the updated PTA list. Comments regarding how patient may be taking medications differently can be found in the Admit Orders Activity  Prior to Admission Medications   Prescriptions Last Dose Informant   cholecalciferol, vitamin D3, (D3-2000 ORAL) Past Week Self   Sig: Take 2,000 Units by mouth once daily.   ibuprofen 200 mg tablet Unknown Self   Sig: Take 1 tablet (200 mg) by mouth every 6 hours if needed for mild pain (1 - 3).   omega 3-dha-epa-fish oil (Fish OiL) 1,000 mg (120 mg-180 mg) capsule Past Week Self   Sig: Take 1 capsule (1,000 mg) by mouth once daily.      Facility-Administered Medications: None        The list below reflects the updated allergy list. Please review each documented allergy for additional clarification and justification.  Allergies  Reviewed by Elizabeth Ca CPhT on 9/10/2024   No Known Allergies         Pharmacy has been updated to [facility].    Sources used to complete the med history include patient interview. Patient is a fair historian.    Below are additional concerns with the patient's PTA list.  None. Patient denies any prescription medications or other OTC medications.    Elizabeth Ca CPhT-Adv  Please reach out via [employer] Secure Chat for questions

## 2024-09-10 NOTE — ASSESSMENT & PLAN NOTE
There is a bowel containing hernia with a transition point in the mid abdomen causing complete small bowel obstruction- patient has been placed on the schedule for tomorrow for a hernia repair, but we will give him some time to decompress with NG tube today.  Would also like to further evaluate the possibility of cholecystitis with evidence of cholelithiasis and gallbladder wall thickening on imaging,  Liver enzymes slightly elevated.  Ultrasound of the gallbladder is ordered and pending to be completed.

## 2024-09-10 NOTE — ED TRIAGE NOTES
Triage note:   Date of encounter: 9/9/2024  MRN: [id number]    I saw the patient as a clinician in triage and performed a brief history and physical exam established acuity and ordered appropriate tests developed basic plan of care.  Patient will be seen by SAÚL and/or the physician who will independently evaluate the patient.  Please see subsequent provider notes for further details and disposition.     Brief HPI:   In brief, this is a 39-year-old male presenting to the emergency department complaints of periumbilical abdominal pain, nausea and vomiting.  Symptoms started this morning.  Patient denies any constipation or diarrhea.  No history of abdominal surgeries.  No fevers or chills.  Patient states that he cannot keep down food or liquid secondary to nausea and vomiting.    Focused physical exam:   General: Alert and oriented.  No acute distress  Vitals: See nursing note for vitals  HEENT: Normocephalic, atraumatic.  External ocular movements are intact.  Mucous membranes moist.   Neck: Soft and supple  Cardiovascular: Clear S1 and S2.  Tachycardic rate and Giller rhythm.   Pulmonary: Lungs are clear to auscultation bilaterally.  Symmetric rise and fall of chest wall.  Abdomen: There is tenderness to palpation in the periumbilical region of the abdomen, however no guarding, rigidity, or rebound tenderness to suggest acute abdomen.  Nondistended and soft.  MSK: Full active range of motion.  Ambulating on own with no difficulties  Neuro: Answering questions appropriately.  Psych: Cooperative with appropriate mood and affect.    Plan/MDM:     Plan for IV fluids, IV morphine, IV Zofran, labs and CT scan of abdomen pelvis.    Please see subsequent provider note for details and disposition

## 2024-09-11 ENCOUNTER — ANESTHESIA EVENT (OUTPATIENT)
Dept: OPERATING ROOM | Facility: HOSPITAL | Age: 39
End: 2024-09-11
Payer: MEDICARE

## 2024-09-11 ENCOUNTER — ANESTHESIA (OUTPATIENT)
Dept: OPERATING ROOM | Facility: HOSPITAL | Age: 39
End: 2024-09-11
Payer: MEDICARE

## 2024-09-11 LAB
ALBUMIN SERPL-MCNC: 4.5 G/DL (ref 3.5–5)
ALP BLD-CCNC: 114 U/L (ref 35–125)
ALT SERPL-CCNC: 58 U/L (ref 5–40)
ANION GAP SERPL CALC-SCNC: 17 MMOL/L
AST SERPL-CCNC: 40 U/L (ref 5–40)
BILIRUB SERPL-MCNC: 1.3 MG/DL (ref 0.1–1.2)
BUN SERPL-MCNC: 27 MG/DL (ref 8–25)
CALCIUM SERPL-MCNC: 10 MG/DL (ref 8.5–10.4)
CHLORIDE SERPL-SCNC: 100 MMOL/L (ref 97–107)
CO2 SERPL-SCNC: 23 MMOL/L (ref 24–31)
CREAT SERPL-MCNC: 1 MG/DL (ref 0.4–1.6)
EGFRCR SERPLBLD CKD-EPI 2021: >90 ML/MIN/1.73M*2
ERYTHROCYTE [DISTWIDTH] IN BLOOD BY AUTOMATED COUNT: 12.4 % (ref 11.5–14.5)
GLUCOSE SERPL-MCNC: 122 MG/DL (ref 65–99)
HCT VFR BLD AUTO: 48.9 % (ref 41–52)
HGB BLD-MCNC: 15.8 G/DL (ref 13.5–17.5)
LACTATE BLDV-SCNC: 1.5 MMOL/L (ref 0.4–2)
MCH RBC QN AUTO: 28.2 PG (ref 26–34)
MCHC RBC AUTO-ENTMCNC: 32.3 G/DL (ref 32–36)
MCV RBC AUTO: 87 FL (ref 80–100)
NRBC BLD-RTO: 0 /100 WBCS (ref 0–0)
PLATELET # BLD AUTO: 280 X10*3/UL (ref 150–450)
POTASSIUM SERPL-SCNC: 4.2 MMOL/L (ref 3.4–5.1)
PROT SERPL-MCNC: 9 G/DL (ref 5.9–7.9)
RBC # BLD AUTO: 5.6 X10*6/UL (ref 4.5–5.9)
SODIUM SERPL-SCNC: 140 MMOL/L (ref 133–145)
WBC # BLD AUTO: 6.7 X10*3/UL (ref 4.4–11.3)

## 2024-09-11 PROCEDURE — 88307 TISSUE EXAM BY PATHOLOGIST: CPT | Performed by: PATHOLOGY

## 2024-09-11 PROCEDURE — 2500000005 HC RX 250 GENERAL PHARMACY W/O HCPCS: Performed by: STUDENT IN AN ORGANIZED HEALTH CARE EDUCATION/TRAINING PROGRAM

## 2024-09-11 PROCEDURE — 0DN84ZZ RELEASE SMALL INTESTINE, PERCUTANEOUS ENDOSCOPIC APPROACH: ICD-10-PCS | Performed by: SURGERY

## 2024-09-11 PROCEDURE — 3600000004 HC OR TIME - INITIAL BASE CHARGE - PROCEDURE LEVEL FOUR: Performed by: SURGERY

## 2024-09-11 PROCEDURE — 0DNU4ZZ RELEASE OMENTUM, PERCUTANEOUS ENDOSCOPIC APPROACH: ICD-10-PCS | Performed by: SURGERY

## 2024-09-11 PROCEDURE — 49616 RPR AA HRN RCR 3-10 NCR/STRN: CPT | Performed by: SURGERY

## 2024-09-11 PROCEDURE — 2500000004 HC RX 250 GENERAL PHARMACY W/ HCPCS (ALT 636 FOR OP/ED)

## 2024-09-11 PROCEDURE — 2500000004 HC RX 250 GENERAL PHARMACY W/ HCPCS (ALT 636 FOR OP/ED): Performed by: SURGERY

## 2024-09-11 PROCEDURE — 7100000002 HC RECOVERY ROOM TIME - EACH INCREMENTAL 1 MINUTE: Performed by: SURGERY

## 2024-09-11 PROCEDURE — 2500000005 HC RX 250 GENERAL PHARMACY W/O HCPCS

## 2024-09-11 PROCEDURE — 44120 REMOVAL OF SMALL INTESTINE: CPT | Performed by: SURGERY

## 2024-09-11 PROCEDURE — 36415 COLL VENOUS BLD VENIPUNCTURE: CPT

## 2024-09-11 PROCEDURE — 2720000007 HC OR 272 NO HCPCS: Performed by: SURGERY

## 2024-09-11 PROCEDURE — 0DT84ZZ RESECTION OF SMALL INTESTINE, PERCUTANEOUS ENDOSCOPIC APPROACH: ICD-10-PCS | Performed by: SURGERY

## 2024-09-11 PROCEDURE — 85027 COMPLETE CBC AUTOMATED: CPT

## 2024-09-11 PROCEDURE — 7100000001 HC RECOVERY ROOM TIME - INITIAL BASE CHARGE: Performed by: SURGERY

## 2024-09-11 PROCEDURE — A4550 SURGICAL TRAYS: HCPCS | Performed by: SURGERY

## 2024-09-11 PROCEDURE — 2500000004 HC RX 250 GENERAL PHARMACY W/ HCPCS (ALT 636 FOR OP/ED): Performed by: ANESTHESIOLOGY

## 2024-09-11 PROCEDURE — 1200000002 HC GENERAL ROOM WITH TELEMETRY DAILY

## 2024-09-11 PROCEDURE — 0D9670Z DRAINAGE OF STOMACH WITH DRAINAGE DEVICE, VIA NATURAL OR ARTIFICIAL OPENING: ICD-10-PCS | Performed by: SURGERY

## 2024-09-11 PROCEDURE — 3700000001 HC GENERAL ANESTHESIA TIME - INITIAL BASE CHARGE: Performed by: SURGERY

## 2024-09-11 PROCEDURE — 36415 COLL VENOUS BLD VENIPUNCTURE: CPT | Performed by: SURGERY

## 2024-09-11 PROCEDURE — 83605 ASSAY OF LACTIC ACID: CPT | Performed by: SURGERY

## 2024-09-11 PROCEDURE — 3700000002 HC GENERAL ANESTHESIA TIME - EACH INCREMENTAL 1 MINUTE: Performed by: SURGERY

## 2024-09-11 PROCEDURE — 88307 TISSUE EXAM BY PATHOLOGIST: CPT | Mod: TC | Performed by: SURGERY

## 2024-09-11 PROCEDURE — 3600000009 HC OR TIME - EACH INCREMENTAL 1 MINUTE - PROCEDURE LEVEL FOUR: Performed by: SURGERY

## 2024-09-11 PROCEDURE — 80053 COMPREHEN METABOLIC PANEL: CPT

## 2024-09-11 RX ORDER — ONDANSETRON HYDROCHLORIDE 2 MG/ML
4 INJECTION, SOLUTION INTRAVENOUS ONCE AS NEEDED
Status: DISCONTINUED | OUTPATIENT
Start: 2024-09-11 | End: 2024-09-11 | Stop reason: HOSPADM

## 2024-09-11 RX ORDER — HYDROMORPHONE HYDROCHLORIDE 2 MG/ML
INJECTION, SOLUTION INTRAMUSCULAR; INTRAVENOUS; SUBCUTANEOUS AS NEEDED
Status: DISCONTINUED | OUTPATIENT
Start: 2024-09-11 | End: 2024-09-11

## 2024-09-11 RX ORDER — HYDROMORPHONE HYDROCHLORIDE 0.2 MG/ML
0.2 INJECTION INTRAMUSCULAR; INTRAVENOUS; SUBCUTANEOUS EVERY 5 MIN PRN
Status: DISCONTINUED | OUTPATIENT
Start: 2024-09-11 | End: 2024-09-11 | Stop reason: HOSPADM

## 2024-09-11 RX ORDER — ROCURONIUM BROMIDE 10 MG/ML
INJECTION, SOLUTION INTRAVENOUS AS NEEDED
Status: DISCONTINUED | OUTPATIENT
Start: 2024-09-11 | End: 2024-09-11

## 2024-09-11 RX ORDER — BUPIVACAINE HYDROCHLORIDE 5 MG/ML
INJECTION, SOLUTION PERINEURAL AS NEEDED
Status: DISCONTINUED | OUTPATIENT
Start: 2024-09-11 | End: 2024-09-11 | Stop reason: HOSPADM

## 2024-09-11 RX ORDER — ALBUTEROL SULFATE 0.83 MG/ML
2.5 SOLUTION RESPIRATORY (INHALATION) ONCE AS NEEDED
Status: DISCONTINUED | OUTPATIENT
Start: 2024-09-11 | End: 2024-09-11 | Stop reason: HOSPADM

## 2024-09-11 RX ORDER — FENTANYL CITRATE 50 UG/ML
25 INJECTION, SOLUTION INTRAMUSCULAR; INTRAVENOUS EVERY 5 MIN PRN
Status: DISCONTINUED | OUTPATIENT
Start: 2024-09-11 | End: 2024-09-11 | Stop reason: HOSPADM

## 2024-09-11 RX ORDER — DIPHENHYDRAMINE HYDROCHLORIDE 50 MG/ML
12.5 INJECTION INTRAMUSCULAR; INTRAVENOUS ONCE AS NEEDED
Status: DISCONTINUED | OUTPATIENT
Start: 2024-09-11 | End: 2024-09-11 | Stop reason: HOSPADM

## 2024-09-11 RX ORDER — IPRATROPIUM BROMIDE 0.5 MG/2.5ML
500 SOLUTION RESPIRATORY (INHALATION) AS NEEDED
Status: DISCONTINUED | OUTPATIENT
Start: 2024-09-11 | End: 2024-09-11 | Stop reason: HOSPADM

## 2024-09-11 RX ORDER — HYDRALAZINE HYDROCHLORIDE 20 MG/ML
5 INJECTION INTRAMUSCULAR; INTRAVENOUS EVERY 30 MIN PRN
Status: DISCONTINUED | OUTPATIENT
Start: 2024-09-11 | End: 2024-09-11 | Stop reason: HOSPADM

## 2024-09-11 RX ORDER — SUCCINYLCHOLINE CHLORIDE 100 MG/5ML
SYRINGE (ML) INTRAVENOUS AS NEEDED
Status: DISCONTINUED | OUTPATIENT
Start: 2024-09-11 | End: 2024-09-11

## 2024-09-11 RX ORDER — FENTANYL CITRATE 50 UG/ML
50 INJECTION, SOLUTION INTRAMUSCULAR; INTRAVENOUS EVERY 5 MIN PRN
Status: DISCONTINUED | OUTPATIENT
Start: 2024-09-11 | End: 2024-09-11 | Stop reason: HOSPADM

## 2024-09-11 RX ORDER — IPRATROPIUM BROMIDE 0.5 MG/2.5ML
500 SOLUTION RESPIRATORY (INHALATION) ONCE
Status: DISCONTINUED | OUTPATIENT
Start: 2024-09-11 | End: 2024-09-11 | Stop reason: HOSPADM

## 2024-09-11 RX ORDER — PROCHLORPERAZINE EDISYLATE 5 MG/ML
5 INJECTION INTRAMUSCULAR; INTRAVENOUS ONCE AS NEEDED
Status: DISCONTINUED | OUTPATIENT
Start: 2024-09-11 | End: 2024-09-11 | Stop reason: HOSPADM

## 2024-09-11 RX ORDER — CEFAZOLIN 1 G/1
INJECTION, POWDER, FOR SOLUTION INTRAVENOUS AS NEEDED
Status: DISCONTINUED | OUTPATIENT
Start: 2024-09-11 | End: 2024-09-11

## 2024-09-11 RX ORDER — LIDOCAINE HYDROCHLORIDE 20 MG/ML
INJECTION, SOLUTION INFILTRATION; PERINEURAL AS NEEDED
Status: DISCONTINUED | OUTPATIENT
Start: 2024-09-11 | End: 2024-09-11

## 2024-09-11 RX ORDER — FENTANYL CITRATE 50 UG/ML
INJECTION, SOLUTION INTRAMUSCULAR; INTRAVENOUS AS NEEDED
Status: DISCONTINUED | OUTPATIENT
Start: 2024-09-11 | End: 2024-09-11

## 2024-09-11 RX ORDER — PROPOFOL 10 MG/ML
INJECTION, EMULSION INTRAVENOUS AS NEEDED
Status: DISCONTINUED | OUTPATIENT
Start: 2024-09-11 | End: 2024-09-11

## 2024-09-11 RX ORDER — OXYCODONE HYDROCHLORIDE 5 MG/1
5 TABLET ORAL EVERY 4 HOURS PRN
Status: DISCONTINUED | OUTPATIENT
Start: 2024-09-11 | End: 2024-09-11 | Stop reason: HOSPADM

## 2024-09-11 RX ORDER — SODIUM CHLORIDE, SODIUM LACTATE, POTASSIUM CHLORIDE, CALCIUM CHLORIDE 600; 310; 30; 20 MG/100ML; MG/100ML; MG/100ML; MG/100ML
100 INJECTION, SOLUTION INTRAVENOUS CONTINUOUS
Status: DISCONTINUED | OUTPATIENT
Start: 2024-09-11 | End: 2024-09-11 | Stop reason: HOSPADM

## 2024-09-11 RX ORDER — MIDAZOLAM HYDROCHLORIDE 1 MG/ML
INJECTION, SOLUTION INTRAMUSCULAR; INTRAVENOUS AS NEEDED
Status: DISCONTINUED | OUTPATIENT
Start: 2024-09-11 | End: 2024-09-11

## 2024-09-11 RX ORDER — SODIUM CHLORIDE, SODIUM LACTATE, POTASSIUM CHLORIDE, CALCIUM CHLORIDE 600; 310; 30; 20 MG/100ML; MG/100ML; MG/100ML; MG/100ML
100 INJECTION, SOLUTION INTRAVENOUS CONTINUOUS
Status: DISCONTINUED | OUTPATIENT
Start: 2024-09-11 | End: 2024-09-11 | Stop reason: ALTCHOICE

## 2024-09-11 RX ORDER — PANTOPRAZOLE SODIUM 40 MG/10ML
40 INJECTION, POWDER, LYOPHILIZED, FOR SOLUTION INTRAVENOUS DAILY
Status: DISCONTINUED | OUTPATIENT
Start: 2024-09-11 | End: 2024-09-20 | Stop reason: HOSPADM

## 2024-09-11 RX ORDER — MEPERIDINE HYDROCHLORIDE 25 MG/ML
12.5 INJECTION INTRAMUSCULAR; INTRAVENOUS; SUBCUTANEOUS EVERY 10 MIN PRN
Status: DISCONTINUED | OUTPATIENT
Start: 2024-09-11 | End: 2024-09-11 | Stop reason: HOSPADM

## 2024-09-11 RX ORDER — LABETALOL HYDROCHLORIDE 5 MG/ML
5 INJECTION, SOLUTION INTRAVENOUS ONCE AS NEEDED
Status: DISCONTINUED | OUTPATIENT
Start: 2024-09-11 | End: 2024-09-11 | Stop reason: HOSPADM

## 2024-09-11 SDOH — SOCIAL STABILITY: SOCIAL INSECURITY: WITHIN THE LAST YEAR, HAVE YOU BEEN HUMILIATED OR EMOTIONALLY ABUSED IN OTHER WAYS BY YOUR PARTNER OR EX-PARTNER?: NO

## 2024-09-11 SDOH — ECONOMIC STABILITY: INCOME INSECURITY: IN THE LAST 12 MONTHS, WAS THERE A TIME WHEN YOU WERE NOT ABLE TO PAY THE MORTGAGE OR RENT ON TIME?: NO

## 2024-09-11 SDOH — ECONOMIC STABILITY: INCOME INSECURITY: IN THE PAST 12 MONTHS, HAS THE ELECTRIC, GAS, OIL, OR WATER COMPANY THREATENED TO SHUT OFF SERVICE IN YOUR HOME?: NO

## 2024-09-11 SDOH — ECONOMIC STABILITY: FOOD INSECURITY: WITHIN THE PAST 12 MONTHS, YOU WORRIED THAT YOUR FOOD WOULD RUN OUT BEFORE YOU GOT MONEY TO BUY MORE.: NEVER TRUE

## 2024-09-11 SDOH — ECONOMIC STABILITY: FOOD INSECURITY: WITHIN THE PAST 12 MONTHS, THE FOOD YOU BOUGHT JUST DIDN'T LAST AND YOU DIDN'T HAVE MONEY TO GET MORE.: NEVER TRUE

## 2024-09-11 SDOH — SOCIAL STABILITY: SOCIAL INSECURITY
WITHIN THE LAST YEAR, HAVE YOU BEEN KICKED, HIT, SLAPPED, OR OTHERWISE PHYSICALLY HURT BY YOUR PARTNER OR EX-PARTNER?: NO

## 2024-09-11 SDOH — SOCIAL STABILITY: SOCIAL INSECURITY: WITHIN THE LAST YEAR, HAVE YOU BEEN AFRAID OF YOUR PARTNER OR EX-PARTNER?: NO

## 2024-09-11 SDOH — ECONOMIC STABILITY: HOUSING INSECURITY: IN THE PAST 12 MONTHS, HOW MANY TIMES HAVE YOU MOVED WHERE YOU WERE LIVING?: 0

## 2024-09-11 SDOH — ECONOMIC STABILITY: HOUSING INSECURITY: AT ANY TIME IN THE PAST 12 MONTHS, WERE YOU HOMELESS OR LIVING IN A SHELTER (INCLUDING NOW)?: NO

## 2024-09-11 SDOH — HEALTH STABILITY: MENTAL HEALTH
HOW OFTEN DO YOU NEED TO HAVE SOMEONE HELP YOU WHEN YOU READ INSTRUCTIONS, PAMPHLETS, OR OTHER WRITTEN MATERIAL FROM YOUR DOCTOR OR PHARMACY?: NEVER

## 2024-09-11 SDOH — SOCIAL STABILITY: SOCIAL INSECURITY
WITHIN THE LAST YEAR, HAVE TO BEEN RAPED OR FORCED TO HAVE ANY KIND OF SEXUAL ACTIVITY BY YOUR PARTNER OR EX-PARTNER?: NO

## 2024-09-11 SDOH — ECONOMIC STABILITY: INCOME INSECURITY: HOW HARD IS IT FOR YOU TO PAY FOR THE VERY BASICS LIKE FOOD, HOUSING, MEDICAL CARE, AND HEATING?: NOT HARD AT ALL

## 2024-09-11 ASSESSMENT — COGNITIVE AND FUNCTIONAL STATUS - GENERAL
DAILY ACTIVITIY SCORE: 24
MOBILITY SCORE: 24
DAILY ACTIVITIY SCORE: 24
MOBILITY SCORE: 24

## 2024-09-11 ASSESSMENT — PAIN - FUNCTIONAL ASSESSMENT
PAIN_FUNCTIONAL_ASSESSMENT: 0-10
PAIN_FUNCTIONAL_ASSESSMENT: CPOT (CRITICAL CARE PAIN OBSERVATION TOOL)
PAIN_FUNCTIONAL_ASSESSMENT: 0-10
PAIN_FUNCTIONAL_ASSESSMENT: 0-10
PAIN_FUNCTIONAL_ASSESSMENT: FLACC (FACE, LEGS, ACTIVITY, CRY, CONSOLABILITY)
PAIN_FUNCTIONAL_ASSESSMENT: 0-10

## 2024-09-11 ASSESSMENT — PAIN SCALES - GENERAL
PAINLEVEL_OUTOF10: 0 - NO PAIN
PAINLEVEL_OUTOF10: 8
PAINLEVEL_OUTOF10: 8
PAINLEVEL_OUTOF10: 3
PAIN_LEVEL: 0
PAINLEVEL_OUTOF10: 0 - NO PAIN
PAINLEVEL_OUTOF10: 0 - NO PAIN
PAINLEVEL_OUTOF10: 3
PAINLEVEL_OUTOF10: 9
PAINLEVEL_OUTOF10: 5 - MODERATE PAIN

## 2024-09-11 ASSESSMENT — PAIN DESCRIPTION - DESCRIPTORS
DESCRIPTORS: THROBBING;TENDER
DESCRIPTORS: ACHING;SHARP;SHOOTING

## 2024-09-11 ASSESSMENT — PAIN DESCRIPTION - ORIENTATION
ORIENTATION: MID
ORIENTATION: MID

## 2024-09-11 ASSESSMENT — ACTIVITIES OF DAILY LIVING (ADL): LACK_OF_TRANSPORTATION: NO

## 2024-09-11 ASSESSMENT — PAIN DESCRIPTION - LOCATION
LOCATION: ABDOMEN
LOCATION: ABDOMEN

## 2024-09-11 NOTE — ANESTHESIA PREPROCEDURE EVALUATION
Patient: Patricio Ayers    Procedure Information       Date/Time: 09/11/24 1200    Procedure: Repair Ventral Hernia Laparoscopy    Location: [facility] / [facility]    Surgeons: Gabino CHAMBERS MD            Relevant Problems   Anesthesia (within normal limits)      Cardiac   (-) History of coronary artery bypass graft   (-) Pacemaker      Pulmonary   (-) Asthma (HHS-HCC)   (-) Chronic obstructive pulmonary disease (Multi)   (-) ADAM (obstructive sleep apnea)      Neuro   (-) CVA (cerebral vascular accident) (Multi)   (-) Seizures (Multi)      Endocrine   (-) Diabetes mellitus, type 2 (Multi)      Hematology   (-) Coagulopathy (Multi)       Clinical information reviewed:   Tobacco  Allergies  Meds   Med Hx  Surg Hx   Fam Hx  Soc Hx        NPO Detail:  NPO/Void Status  Date of Last Liquid: 09/10/24  Date of Last Solid: 09/10/24         Physical Exam    Airway  Mallampati: I  TM distance: >3 FB  Neck ROM: full     Cardiovascular    Dental    Pulmonary    Abdominal            Anesthesia Plan    History of general anesthesia?: yes  History of complications of general anesthesia?: no    ASA 2     general   (RSI)  intravenous induction   Postoperative administration of opioids is intended.  Anesthetic plan and risks discussed with patient.  Use of blood products discussed with patient who consented to blood products.

## 2024-09-11 NOTE — INTERVAL H&P NOTE
H&P reviewed. The patient was examined and there are no changes to the H&P.    The patient presents with persistent small bowel obstruction.  He has a recurrent ventral hernia, and an area distal to the hernia that appears to be obstructed.  I explained to him the risks and benefits going operating for a laparoscopic ventral hernia repair and lysis of adhesions.  These risk include risk of bleeding, infection, mesh infection, or injury to surround structures.  The patient agrees to proceed with the operation.

## 2024-09-11 NOTE — NURSING NOTE
Report called to Geri Garcia RN. Patient to be transported by nurse via wheelchair to surgical unit

## 2024-09-11 NOTE — PROGRESS NOTES
Spiritual Care Visit    Clinical Encounter Type  Visited With: Patient  Routine Visit: Introduction  Continue Visiting: No         Values/Beliefs  Spiritual Requests During Hospitalization: Patricio only wnted a blessing today.    Sacramental Encounters  Communion: Does not want communion  Communion Given Indicator: No  Sacrament of Sick-Anointing: Patient declined anointing     Curtis Vaughan

## 2024-09-11 NOTE — NURSING NOTE
Pt arrived to room 464 at this time, pt had emesis see I&O, pt reconnected to LIWS via NG and pt medicated as ordered with Zofran see MAR

## 2024-09-11 NOTE — ANESTHESIA POSTPROCEDURE EVALUATION
Patient: Patricio Ayers    Procedure Summary       Date: 09/11/24 Room / Location: [facility] OR 12 / [facility]    Anesthesia Start: 1325 Anesthesia Stop: 1601    Procedure: Repair Ventral Hernia Laparoscopy Diagnosis:       Generalized abdominal pain      Ventral hernia with obstruction and without gangrene      (Generalized abdominal pain [R10.84])      (Ventral hernia with obstruction and without gangrene [K43.6])    Surgeons: Gabino CHAMBERS MD Responsible Provider: Javi Robbins MD    Anesthesia Type: general ASA Status: 2            Anesthesia Type: general    Vitals Value Taken Time   /98 09/11/24 1553   Temp 36.2 °C (97.2 °F) 09/11/24 1553   Pulse 107 09/11/24 1553   Resp 21 09/11/24 1553   SpO2 93 % 09/11/24 1553       Anesthesia Post Evaluation    Patient location during evaluation: PACU  Patient participation: complete - patient participated  Level of consciousness: sleepy but conscious  Pain score: 0  Pain management: adequate  Multimodal analgesia pain management approach  Airway patency: patent  Two or more strategies used to mitigate risk of obstructive sleep apnea  Cardiovascular status: acceptable  Respiratory status: acceptable  Hydration status: acceptable  Postoperative Nausea and Vomiting: none        There were no known notable events for this encounter.

## 2024-09-11 NOTE — OP NOTE
Repair Ventral Hernia Laparoscopy Operative Note     Date: 2024 - 2024  OR Location: [facility]    Name: Patricio Ayers, : 1985, Age: 39 y.o., MRN: [id number], Sex: male    Diagnosis  Pre-op Diagnosis      * Generalized abdominal pain [R10.84]     * Ventral hernia with obstruction and without gangrene [K43.6] Post-op Diagnosis     * Generalized abdominal pain [R10.84]     * Ventral hernia with obstruction and without gangrene [K43.6]     Procedures  Repair Ventral Hernia Laparoscopy  60214 - MA RPR AA HERNIA RECR 3-10 CM NCRC8/STRANGULATED  Lysis of Adhesions  Small Bowel Resection     Surgeons      * Gabino La V - Primary    Resident/Fellow/Other Assistant:  Surgeons and Role:     * Sergio Mc, PA-C - SAÚL First Assist     * Kianna Ang, PA-C - SA[facility]L First Assist    Procedure Summary  Anesthesia: General  ASA: II  Anesthesia Staff: Anesthesiologist: Unique Martinez MD; Javi Robbins MD; Cuco Arnold MD  CRNA: IRINEO Cristobal-CRNA  C-AA: SARAHI Acosta  Estimated Blood Loss: 100 mL  Intra-op Medications:   Administrations occurring from 1200 to 1445 on 24:   Medication Name Total Dose   benzocaine-menthol (Cepastat Sore Throat) lozenge 1 lozenge Cannot be calculated   HYDROmorphone (Dilaudid) injection 0.2 mg Cannot be calculated   HYDROmorphone (Dilaudid) injection 0.4 mg Cannot be calculated   HYDROmorphone (Dilaudid) injection 0.6 mg Cannot be calculated   HYDROmorphone (Dilaudid) injection 1 mg Cannot be calculated   ondansetron (Zofran) injection 4 mg Cannot be calculated   phenoL (Chloraseptic) 1.4 % mouth/throat spray 1 spray Cannot be calculated              Anesthesia Record               Intraprocedure I/O Totals          Intake    Dexmedetomidine 0.00 mL    The total shown is the total volume documented since Anesthesia Start was filed.    LR bolus 1000.00 mL    Total Intake 1000 mL          Specimen: No specimens collected     Staff:   Circulator:  Radha  Scrub Person: Celia  Scrub Person: Lety  Scrub Person: Arnaud Castro Circulator: Geri Castro Scrub: Abhi         Drains and/or Catheters:   NG/OG/Feeding Tube 18 Fr Right nostril (Active)   Tube Status Low intermittent suction 09/10/24 0301   Placement Verification X-ray 09/10/24 0301   Gastric Aspirate Grassy green (gastric) 09/10/24 0301   Site Assessment Clean;Dry;Intact 09/11/24 1006   Drainage Appearance Green 09/11/24 0600   Output (mL) 900 mL 09/11/24 1120         Findings: Recurrent, incarcerated ventral hernia.  Extensive inter-loop adhesions.  30 cm section of small bowel with multiple interloop adhesions with accordioning of the small bowel resected and re-anastomosed.      Indications: Patricio Ayers is an 39 y.o. male who is having surgery for Generalized abdominal pain [R10.84]  Ventral hernia with obstruction and without gangrene [K43.6].  The patient presented to the emergency department with 1 day of worsening epigastric abdominal pain associate with nausea and emesis.  He had a history of prior incarcerated ventral hernia that was repaired emergently 1 year ago.  He underwent CT scan, which showed a recurrent ventral hernia and associated small bowel obstruction.  I explained to him the risks and benefits of going to the operating room for a laparoscopic ventral hernia repair, lysis of adhesions, and possible small bowel resection.  These risk include risk of bleeding, infection, or injury to surround structures.  The patient agreed to proceed with the operation.    The patient was seen in the preoperative area. The risks, benefits, complications, treatment options, non-operative alternatives, expected recovery and outcomes were discussed with the patient. The possibilities of reaction to medication, pulmonary aspiration, injury to surrounding structures, bleeding, recurrent infection, the need for additional procedures, failure to diagnose a condition, and creating a  complication requiring transfusion or operation were discussed with the patient. The patient concurred with the proposed plan, giving informed consent.  The site of surgery was properly noted/marked if necessary per policy. The patient has been actively warmed in preoperative area. Preoperative antibiotics have been ordered and given within 1 hours of incision. Venous thrombosis prophylaxis have been ordered including bilateral sequential compression devices    Procedure Details:   The patient was brought to the operating room, placed supine on the operating table, and general endotracheal esthesia was induced.  His abdomen was then prepped and draped sterilely.  We began by making an incision in the left upper quadrant at Russo's point, and used an optical view trocar to get access to the abdomen.  The abdomen was then insufflated to 15 mmHg.  On inspection of the abdomen he was noted to have multiple adhesions of his small bowel as well as omentum to his anterior abdominal wall along with a recurrent ventral hernia containing a portion of the small bowel.  We performed some laparoscopic lysis of adhesions, taking down a majority of the omental adhesions as well as some of the small bowel adhesions, but there was a portion of the small bowel that was densely adherent to the anterior abdominal wall and the peritoneal hernia sac.  We therefore made an incision over his prior hernia repair, and dissected out the hernia sac.  Through meticulous dissection we are able to take down the adhesions.  We then eviscerated the small bowel.  He was noted to have an area distal to his incarcerated bowel which had multiple interloop adhesions and resulting accordioning of the small bowel on itself.  Lysis of these adhesions resulted in multiple serosal tears.  Given that he would likely reform these adhesions, we elected to resect this portion of the bowel.  The entire section of bowel measured 30 cm in length.  We used blue  loads on a 75 mm TICO stapler to transect the bowel proximally and medially.  We then used a LigaSure device in order to take the mesentery.  We performed a stapled side-to-side, functional end-to-end anastomosis using another 75 mm blue load on the TICO stapler.  We closed the common enterotomy with another blue load.  We oversewed the common enterotomy using 2-0 Vicryl sutures.  We then closed the mesenteric defect using a 2-0 silk suture.  We then placed the bowel back into the abdomen.  We closed the recurrent ventral hernia using 0 PDS suture in running fashion.  We then closed the skin incisions using staples.  At the end of the procedure all needle, sponge, and instrument counts were correct.  The patient tolerated the procedure well and was brought to the postanesthesia care unit for recovery.      Complications:  None; patient tolerated the procedure well.    Disposition: PACU - hemodynamically stable.  Condition: stable   0    Attending Attestation: I was present and scrubbed for the entire procedure.    Gabino La V  Phone Number: [phone]

## 2024-09-11 NOTE — PROGRESS NOTES
09/11/24 1110   Discharge Planning   Living Arrangements Parent   Support Systems Parent   Assistance Needed Patient reports he is indpendent of ADLs and most IADLs. Reports he does not drive so mom provides him transportation.   Type of Residence Private residence   Number of Stairs Within Residence 10   Do you have animals or pets at home? No   Who is requesting discharge planning? Provider   Home or Post Acute Services None   Expected Discharge Disposition Home   Does the patient need discharge transport arranged? No   Financial Resource Strain   How hard is it for you to pay for the very basics like food, housing, medical care, and heating? Not hard   Housing Stability   In the last 12 months, was there a time when you were not able to pay the mortgage or rent on time? N   In the past 12 months, how many times have you moved where you were living? 0   At any time in the past 12 months, were you homeless or living in a shelter (including now)? N   Transportation Needs   In the past 12 months, has lack of transportation kept you from medical appointments or from getting medications? no   In the past 12 months, has lack of transportation kept you from meetings, work, or from getting things needed for daily living? No     MSW met with patient at bedside. Patient reports he lives at home with his mother. He reports he is primarily independent, but does not drive, so mother transports him as needed to the grocery store, appointments, etc. Patient denies any home going needs or concerns at this time. Patient plans to return home upon discharge.

## 2024-09-11 NOTE — ANESTHESIA PROCEDURE NOTES
Airway  Date/Time: 9/11/2024 1:34 PM  Urgency: elective    Airway not difficult    Staffing  Performed: CRNA   Authorized by: Cuco Arnold MD    Performed by: IRINEO Cristobal-MARTINA  Patient location during procedure: OR    Indications and Patient Condition  Indications for airway management: anesthesia  Spontaneous Ventilation: absent  Sedation level: deep  Preoxygenated: yes  Patient position: sniffing  Mask difficulty assessment: 0 - not attempted  Planned trial extubation    Final Airway Details  Final airway type: endotracheal airway      Successful airway: ETT  Cuffed: yes   Successful intubation technique: direct laryngoscopy  Facilitating devices/methods: intubating stylet and cricoid pressure  Blade: Mckenzie  Blade size: #4  ETT size (mm): 7.5  Cormack-Lehane Classification: grade I - full view of glottis  Placement verified by: chest auscultation and capnometry   Measured from: teeth  ETT to teeth (cm): 22  Number of attempts at approach: 1    Additional Comments  RSI with cricoid pressure. Atraumatic intubation, Lips and teeth in pre-anesthetic condition.

## 2024-09-12 LAB
ALBUMIN SERPL-MCNC: 4.1 G/DL (ref 3.5–5)
ALP BLD-CCNC: 111 U/L (ref 35–125)
ALT SERPL-CCNC: 65 U/L (ref 5–40)
ANION GAP SERPL CALC-SCNC: 16 MMOL/L
AST SERPL-CCNC: 43 U/L (ref 5–40)
BILIRUB SERPL-MCNC: 2.2 MG/DL (ref 0.1–1.2)
BUN SERPL-MCNC: 26 MG/DL (ref 8–25)
CALCIUM SERPL-MCNC: 9.3 MG/DL (ref 8.5–10.4)
CHLORIDE SERPL-SCNC: 101 MMOL/L (ref 97–107)
CO2 SERPL-SCNC: 23 MMOL/L (ref 24–31)
CREAT SERPL-MCNC: 1 MG/DL (ref 0.4–1.6)
EGFRCR SERPLBLD CKD-EPI 2021: >90 ML/MIN/1.73M*2
ERYTHROCYTE [DISTWIDTH] IN BLOOD BY AUTOMATED COUNT: 12.4 % (ref 11.5–14.5)
GLUCOSE SERPL-MCNC: 197 MG/DL (ref 65–99)
HCT VFR BLD AUTO: 52.4 % (ref 41–52)
HGB BLD-MCNC: 17 G/DL (ref 13.5–17.5)
MCH RBC QN AUTO: 28.4 PG (ref 26–34)
MCHC RBC AUTO-ENTMCNC: 32.4 G/DL (ref 32–36)
MCV RBC AUTO: 88 FL (ref 80–100)
NRBC BLD-RTO: 0 /100 WBCS (ref 0–0)
PLATELET # BLD AUTO: 311 X10*3/UL (ref 150–450)
POTASSIUM SERPL-SCNC: 4.4 MMOL/L (ref 3.4–5.1)
PROT SERPL-MCNC: 8.5 G/DL (ref 5.9–7.9)
RBC # BLD AUTO: 5.98 X10*6/UL (ref 4.5–5.9)
SODIUM SERPL-SCNC: 140 MMOL/L (ref 133–145)
WBC # BLD AUTO: 8 X10*3/UL (ref 4.4–11.3)

## 2024-09-12 PROCEDURE — 80053 COMPREHEN METABOLIC PANEL: CPT | Performed by: SURGERY

## 2024-09-12 PROCEDURE — 2500000004 HC RX 250 GENERAL PHARMACY W/ HCPCS (ALT 636 FOR OP/ED): Performed by: SURGERY

## 2024-09-12 PROCEDURE — 36415 COLL VENOUS BLD VENIPUNCTURE: CPT | Performed by: SURGERY

## 2024-09-12 PROCEDURE — 85027 COMPLETE CBC AUTOMATED: CPT | Performed by: SURGERY

## 2024-09-12 PROCEDURE — 1200000002 HC GENERAL ROOM WITH TELEMETRY DAILY

## 2024-09-12 RX ORDER — SODIUM CHLORIDE, SODIUM LACTATE, POTASSIUM CHLORIDE, CALCIUM CHLORIDE 600; 310; 30; 20 MG/100ML; MG/100ML; MG/100ML; MG/100ML
125 INJECTION, SOLUTION INTRAVENOUS CONTINUOUS
Status: DISCONTINUED | OUTPATIENT
Start: 2024-09-12 | End: 2024-09-15

## 2024-09-12 ASSESSMENT — PAIN - FUNCTIONAL ASSESSMENT
PAIN_FUNCTIONAL_ASSESSMENT: 0-10

## 2024-09-12 ASSESSMENT — PAIN SCALES - GENERAL
PAINLEVEL_OUTOF10: 3
PAINLEVEL_OUTOF10: 8
PAINLEVEL_OUTOF10: 3
PAINLEVEL_OUTOF10: 8
PAINLEVEL_OUTOF10: 5 - MODERATE PAIN
PAINLEVEL_OUTOF10: 2

## 2024-09-12 ASSESSMENT — PAIN DESCRIPTION - DESCRIPTORS: DESCRIPTORS: ACHING

## 2024-09-12 ASSESSMENT — COGNITIVE AND FUNCTIONAL STATUS - GENERAL: MOBILITY SCORE: 24

## 2024-09-12 ASSESSMENT — PAIN DESCRIPTION - ORIENTATION
ORIENTATION: MID
ORIENTATION: MID

## 2024-09-12 ASSESSMENT — PAIN DESCRIPTION - LOCATION
LOCATION: ABDOMEN
LOCATION: ABDOMEN

## 2024-09-12 NOTE — PROGRESS NOTES
"Patricio Ayers is a 39 y.o. male on day 1 of admission presenting with Small bowel obstruction (Multi).    Subjective   Feeling \"okay\".  Abdominal pain is intermittent.  Patient not passing any flatus yet.  Objective     Physical Exam  Vitals and nursing note reviewed.   Constitutional:       Appearance: Normal appearance.   HENT:      Head: Normocephalic and atraumatic.   Cardiovascular:      Rate and Rhythm: Normal rate and regular rhythm.   Pulmonary:      Effort: Pulmonary effort is normal. No respiratory distress.   Abdominal:      General: Bowel sounds are normal. There is no distension.      Palpations: Abdomen is soft.      Tenderness: There is no abdominal tenderness.      Comments: Midline incision with dressing. Shadowing on inferior aspect of dressing, otherwise CDI. Lap incisions with bandaids.     NGT in place to LIWS with some bilious output   Musculoskeletal:         General: No swelling or tenderness.   Skin:     General: Skin is warm and dry.   Neurological:      Mental Status: He is alert and oriented to person, place, and time.         Last Recorded Vitals  Blood pressure 108/72, pulse (!) 124, temperature 36.5 °C (97.7 °F), temperature source Axillary, resp. rate 18, height 1.88 m (6' 2\"), weight 115 kg (254 lb 6.6 oz), SpO2 95%.  Intake/Output last 3 Shifts:  I/O last 3 completed shifts:  In: 6001.7 (52 mL/kg) [P.O.:180; I.V.:4121.7 (35.7 mL/kg); IV Piggyback:1700]  Out: 6050 (52.4 mL/kg) [Urine:1850 (0.4 mL/kg/hr); Emesis/NG output:4200]  Weight: 115.4 kg     Relevant Results  Lab Results   Component Value Date    GLUCOSE 197 (H) 09/12/2024    CALCIUM 9.3 09/12/2024     09/12/2024    K 4.4 09/12/2024    CO2 23 (L) 09/12/2024     09/12/2024    BUN 26 (H) 09/12/2024    CREATININE 1.00 09/12/2024     Lab Results   Component Value Date    WBC 8.0 09/12/2024    HGB 17.0 09/12/2024    HCT 52.4 (H) 09/12/2024    MCV 88 09/12/2024     09/12/2024     Lab Results   Component Value Date "    ALT 65 (H) 09/12/2024    AST 43 (H) 09/12/2024    ALKPHOS 111 09/12/2024    BILITOT 2.2 (H) 09/12/2024         Assessment/Plan   Assessment & Plan  Small bowel obstruction (Multi)  Patient with past history of ventral hernia causing bowel obstruction in 2023 requiring surgical repair.  NG tube inserted overnight.  Patient accidentally removed tube at some point in the emergency department.  I replaced the tube.  Output is thin, dark green.   Continue NG tube to low intermittent wall suction.  N.p.o., ice chips okay.  Generalized abdominal pain  There is a bowel containing hernia with a transition point in the mid abdomen causing complete small bowel obstruction- patient has been placed on the schedule for tomorrow for a hernia repair, but we will give him some time to decompress with NG tube today.  Would also like to further evaluate the possibility of cholecystitis with evidence of cholelithiasis and gallbladder wall thickening on imaging,  Liver enzymes slightly elevated.  Ultrasound of the gallbladder is ordered and pending to be completed.  Ventral hernia with obstruction and without gangrene    9/12: Patient taken for OR yesterday, findings as follows: Recurrent, incarcerated ventral hernia.  Extensive inter-loop adhesions.  30 cm section of small bowel with multiple interloop adhesions with accordioning of the small bowel resected and re-anastomosed   Patient is now postop day 1 ventral hernia repair.  He has not started passing any flatus yet so we will continue NG tube to low intermittent suction until he starts to have bowel function.  For now maintain n.p.o. status.  IV fluids infusing.  Patient also got a liter of IV fluids this morning as he was tachycardic overnight as well as febrile earlier this morning.  Patient denies feeling feverish or having the chills.  Continue to trend CBC and LFTs daily.  Will continue IV fluids for now.  If patient becomes febrile or clinically worsens, will complete  sepsis workup.  D/W Dr. Abhinav CHURCH spent 35 minutes in the professional and overall care of this patient.      Laurent Coombs, IRINEO-CNP

## 2024-09-12 NOTE — DOCUMENTATION CLARIFICATION NOTE
"    PATIENT:               PATTI RIVAS  ACCT #:                  [id number]  MRN:                       [id number]  :                       1985  ADMIT DATE:       2024 10:08 PM  DISCH DATE:  RESPONDING PROVIDER #:        [id number]          PROVIDER RESPONSE TEXT:    Non-infectious SIRS without acute organ dysfunction    CDI QUERY TEXT:    Clarification        Instruction:    Based on your assessment of the patient and the clinical information, please provide the requested documentation by clicking on the appropriate radio button and enter any additional information if prompted.    Question: Please further clarify if there is a diagnosis related to the clinical information    When answering this query, please exercise your independent professional judgment. The fact that a question is being asked, does not imply that any particular answer is desired or expected.    The patient's clinical indicators include:  Clinical Information: \"39 y.o. male with a past history of ventral hernia with bowel obstruction requiring repair, borderline type II diabetic, SURJIT,  presenting with abdominal pain that started early this morning.  He reports the pain is generalized throughout his abdomen with associated nausea and vomiting.  Patient denies any issues with constipation or diarrhea but does state that he has not had any solid food in about 5 days.\" HP     Clinical Indicators:    Lab results: : WBC 13.8, neutrophils 11.75, cr 1.7, lactate 3.0    VS:  101 HR,  120 HR, 38.2 c    : HP: \"Patient also presents with SURJIT, creatinine more than double his baseline. He hasn't had much PO over the past few days. He did receive 1L NS bolus in ER. Will order 1 additional NS 1L Bolus followed by maintenance fluids. Recheck kidney function in AM.\" \"Will hold off on empiric antibiotics for now. Prophylactic abx on call to OR.\"    Treatment: Monitor lab values. IV ns bolus 1 liter x 2, IV ns 100 ml/hr, IV 1/2 ns 100 " ml/hr    Risk Factors: low p.o. intake over a few days, SBO, Ventral hernia, cholelithiasis, DM  Options provided:  -- Non-infectious SIRS with acute organ dysfunction of SURJIT  -- Non-infectious SIRS without acute organ dysfunction  -- Other - I will add my own diagnosis  -- Refer to Clinical Documentation Reviewer    Query created by: Dylan Heck on 9/12/2024 8:18 AM      Electronically signed by:  SKY CORTEZ MD 9/12/2024 8:52 AM

## 2024-09-13 LAB
ALBUMIN SERPL-MCNC: 3.5 G/DL (ref 3.5–5)
ALP BLD-CCNC: 87 U/L (ref 35–125)
ALT SERPL-CCNC: 36 U/L (ref 5–40)
ANION GAP SERPL CALC-SCNC: 11 MMOL/L
AST SERPL-CCNC: 16 U/L (ref 5–40)
BILIRUB SERPL-MCNC: 1.7 MG/DL (ref 0.1–1.2)
BUN SERPL-MCNC: 18 MG/DL (ref 8–25)
CALCIUM SERPL-MCNC: 9.3 MG/DL (ref 8.5–10.4)
CHLORIDE SERPL-SCNC: 100 MMOL/L (ref 97–107)
CO2 SERPL-SCNC: 29 MMOL/L (ref 24–31)
CREAT SERPL-MCNC: 0.7 MG/DL (ref 0.4–1.6)
EGFRCR SERPLBLD CKD-EPI 2021: >90 ML/MIN/1.73M*2
ERYTHROCYTE [DISTWIDTH] IN BLOOD BY AUTOMATED COUNT: 12.3 % (ref 11.5–14.5)
GLUCOSE SERPL-MCNC: 156 MG/DL (ref 65–99)
HCT VFR BLD AUTO: 44.4 % (ref 41–52)
HGB BLD-MCNC: 14.4 G/DL (ref 13.5–17.5)
MCH RBC QN AUTO: 28.7 PG (ref 26–34)
MCHC RBC AUTO-ENTMCNC: 32.4 G/DL (ref 32–36)
MCV RBC AUTO: 89 FL (ref 80–100)
NRBC BLD-RTO: 0 /100 WBCS (ref 0–0)
PLATELET # BLD AUTO: 243 X10*3/UL (ref 150–450)
POTASSIUM SERPL-SCNC: 4 MMOL/L (ref 3.4–5.1)
PROT SERPL-MCNC: 7.6 G/DL (ref 5.9–7.9)
RBC # BLD AUTO: 5.01 X10*6/UL (ref 4.5–5.9)
SODIUM SERPL-SCNC: 140 MMOL/L (ref 133–145)
WBC # BLD AUTO: 6.5 X10*3/UL (ref 4.4–11.3)

## 2024-09-13 PROCEDURE — 99024 POSTOP FOLLOW-UP VISIT: CPT | Performed by: NURSE PRACTITIONER

## 2024-09-13 PROCEDURE — 36415 COLL VENOUS BLD VENIPUNCTURE: CPT | Performed by: SURGERY

## 2024-09-13 PROCEDURE — 1200000002 HC GENERAL ROOM WITH TELEMETRY DAILY

## 2024-09-13 PROCEDURE — 2500000004 HC RX 250 GENERAL PHARMACY W/ HCPCS (ALT 636 FOR OP/ED): Performed by: SURGERY

## 2024-09-13 PROCEDURE — 84460 ALANINE AMINO (ALT) (SGPT): CPT | Performed by: SURGERY

## 2024-09-13 PROCEDURE — 85027 COMPLETE CBC AUTOMATED: CPT | Performed by: SURGERY

## 2024-09-13 ASSESSMENT — COGNITIVE AND FUNCTIONAL STATUS - GENERAL
DAILY ACTIVITIY SCORE: 24
DAILY ACTIVITIY SCORE: 24
MOBILITY SCORE: 24
MOBILITY SCORE: 24

## 2024-09-13 ASSESSMENT — PAIN SCALES - GENERAL
PAINLEVEL_OUTOF10: 0 - NO PAIN
PAINLEVEL_OUTOF10: 8
PAINLEVEL_OUTOF10: 5 - MODERATE PAIN
PAINLEVEL_OUTOF10: 7

## 2024-09-13 ASSESSMENT — PAIN - FUNCTIONAL ASSESSMENT
PAIN_FUNCTIONAL_ASSESSMENT: 0-10
PAIN_FUNCTIONAL_ASSESSMENT: FLACC (FACE, LEGS, ACTIVITY, CRY, CONSOLABILITY)
PAIN_FUNCTIONAL_ASSESSMENT: 0-10
PAIN_FUNCTIONAL_ASSESSMENT: 0-10

## 2024-09-13 ASSESSMENT — PAIN DESCRIPTION - LOCATION
LOCATION: ABDOMEN

## 2024-09-13 ASSESSMENT — PAIN DESCRIPTION - ORIENTATION
ORIENTATION: MID

## 2024-09-13 ASSESSMENT — PAIN DESCRIPTION - DESCRIPTORS: DESCRIPTORS: ACHING;STABBING;TENDER

## 2024-09-13 NOTE — PROGRESS NOTES
09/13/24 0958   Discharge Planning   Expected Discharge Disposition Home   Does the patient need discharge transport arranged? No

## 2024-09-13 NOTE — PROGRESS NOTES
"Patricio Ayers is a 39 y.o. male on day 2 of admission presenting with Small bowel obstruction (Multi).    Subjective   Feeling \"okay\".  Abdominal pain is intermittent.  No flatus. Ngt output over 2 liters overnight per nursing. Tachycardic.   Objective     Physical Exam  Vitals and nursing note reviewed.   Constitutional:       Appearance: Normal appearance.   HENT:      Head: Normocephalic and atraumatic.   Cardiovascular:      Rate and Rhythm: Normal rate and regular rhythm.   Pulmonary:      Effort: Pulmonary effort is normal. No respiratory distress.   Abdominal:      General: Bowel sounds are normal. There is no distension.      Palpations: Abdomen is soft.      Tenderness: There is no abdominal tenderness.      Comments: Midline incision with dressing. Shadowing on inferior aspect of dressing, otherwise CDI. Lap incisions with bandaids.     NGT in place to LIWS with some bilious output   Musculoskeletal:         General: No swelling or tenderness.   Skin:     General: Skin is warm and dry.   Neurological:      Mental Status: He is alert and oriented to person, place, and time.         Last Recorded Vitals  Blood pressure 117/81, pulse (!) 115, temperature 36.6 °C (97.9 °F), temperature source Temporal, resp. rate 18, height 1.88 m (6' 2\"), weight 115 kg (254 lb 6.4 oz), SpO2 96%.  Intake/Output last 3 Shifts:  I/O last 3 completed shifts:  In: 6062.6 (52.5 mL/kg) [P.O.:300; I.V.:4012.6 (34.8 mL/kg); IV Piggyback:1750]  Out: 7350 (63.7 mL/kg) [Urine:2800 (0.7 mL/kg/hr); Emesis/NG output:4550]  Weight: 115.4 kg     Relevant Results  Lab Results   Component Value Date    GLUCOSE 156 (H) 09/13/2024    CALCIUM 9.3 09/13/2024     09/13/2024    K 4.0 09/13/2024    CO2 29 09/13/2024     09/13/2024    BUN 18 09/13/2024    CREATININE 0.70 09/13/2024     Lab Results   Component Value Date    WBC 6.5 09/13/2024    HGB 14.4 09/13/2024    HCT 44.4 09/13/2024    MCV 89 09/13/2024     09/13/2024     Lab " Results   Component Value Date    ALT 36 09/13/2024    AST 16 09/13/2024    ALKPHOS 87 09/13/2024    BILITOT 1.7 (H) 09/13/2024         Assessment/Plan   Assessment & Plan  Small bowel obstruction (Multi)  Patient with past history of ventral hernia causing bowel obstruction in 2023 requiring surgical repair.  NG tube inserted overnight.  Patient accidentally removed tube at some point in the emergency department.  I replaced the tube.  Output is thin, dark green.   Continue NG tube to low intermittent wall suction.  N.p.o., ice chips okay.  Generalized abdominal pain  There is a bowel containing hernia with a transition point in the mid abdomen causing complete small bowel obstruction- patient has been placed on the schedule for tomorrow for a hernia repair, but we will give him some time to decompress with NG tube today.  Would also like to further evaluate the possibility of cholecystitis with evidence of cholelithiasis and gallbladder wall thickening on imaging,  Liver enzymes slightly elevated.  Ultrasound of the gallbladder is ordered and pending to be completed.  Ventral hernia with obstruction and without gangrene    9/13/24: continue ngt to LIWS. DVT and GI prophylaxis. Ambulate. PT, OT, IS. Cbc and lfts daily.     9/12: Patient taken for OR yesterday, findings as follows: Recurrent, incarcerated ventral hernia.  Extensive inter-loop adhesions.  30 cm section of small bowel with multiple interloop adhesions with accordioning of the small bowel resected and re-anastomosed   Patient is now postop day 1 ventral hernia repair.  He has not started passing any flatus yet so we will continue NG tube to low intermittent suction until he starts to have bowel function.  For now maintain n.p.o. status.  IV fluids infusing.  Patient also got a liter of IV fluids this morning as he was tachycardic overnight as well as febrile earlier this morning.  Patient denies feeling feverish or having the chills.  Continue to trend  CBC and LFTs daily.  Will continue IV fluids for now.  If patient becomes febrile or clinically worsens, will complete sepsis workup.  D/W Dr. Abhinav CHURCH spent 15 minutes in the professional and overall care of this patient.      IRINEO Reveles-CNP

## 2024-09-13 NOTE — NURSING NOTE
Assumed care of pt. Pt laying in bed sleeping, NAD. No signs of pain per the FLACC scale. NGT to LIWS, thin dark green output in canister. RA. BSSR completed

## 2024-09-14 LAB
ALBUMIN SERPL-MCNC: 3.5 G/DL (ref 3.5–5)
ALP BLD-CCNC: 106 U/L (ref 35–125)
ALT SERPL-CCNC: 28 U/L (ref 5–40)
ANION GAP SERPL CALC-SCNC: 15 MMOL/L
AST SERPL-CCNC: 15 U/L (ref 5–40)
BILIRUB SERPL-MCNC: 1.9 MG/DL (ref 0.1–1.2)
BUN SERPL-MCNC: 17 MG/DL (ref 8–25)
CALCIUM SERPL-MCNC: 9.7 MG/DL (ref 8.5–10.4)
CHLORIDE SERPL-SCNC: 98 MMOL/L (ref 97–107)
CO2 SERPL-SCNC: 29 MMOL/L (ref 24–31)
CREAT SERPL-MCNC: 0.7 MG/DL (ref 0.4–1.6)
EGFRCR SERPLBLD CKD-EPI 2021: >90 ML/MIN/1.73M*2
ERYTHROCYTE [DISTWIDTH] IN BLOOD BY AUTOMATED COUNT: 12.3 % (ref 11.5–14.5)
GLUCOSE SERPL-MCNC: 133 MG/DL (ref 65–99)
HCT VFR BLD AUTO: 42.9 % (ref 41–52)
HGB BLD-MCNC: 14.3 G/DL (ref 13.5–17.5)
MCH RBC QN AUTO: 29.1 PG (ref 26–34)
MCHC RBC AUTO-ENTMCNC: 33.3 G/DL (ref 32–36)
MCV RBC AUTO: 87 FL (ref 80–100)
NRBC BLD-RTO: 0 /100 WBCS (ref 0–0)
PLATELET # BLD AUTO: 245 X10*3/UL (ref 150–450)
POTASSIUM SERPL-SCNC: 3.8 MMOL/L (ref 3.4–5.1)
PROT SERPL-MCNC: 7.8 G/DL (ref 5.9–7.9)
RBC # BLD AUTO: 4.92 X10*6/UL (ref 4.5–5.9)
SODIUM SERPL-SCNC: 142 MMOL/L (ref 133–145)
WBC # BLD AUTO: 8.4 X10*3/UL (ref 4.4–11.3)

## 2024-09-14 PROCEDURE — 99024 POSTOP FOLLOW-UP VISIT: CPT | Performed by: SURGERY

## 2024-09-14 PROCEDURE — 85027 COMPLETE CBC AUTOMATED: CPT | Performed by: SURGERY

## 2024-09-14 PROCEDURE — 80053 COMPREHEN METABOLIC PANEL: CPT | Performed by: SURGERY

## 2024-09-14 PROCEDURE — 36415 COLL VENOUS BLD VENIPUNCTURE: CPT | Performed by: SURGERY

## 2024-09-14 PROCEDURE — 2500000004 HC RX 250 GENERAL PHARMACY W/ HCPCS (ALT 636 FOR OP/ED): Performed by: SURGERY

## 2024-09-14 PROCEDURE — 1200000002 HC GENERAL ROOM WITH TELEMETRY DAILY

## 2024-09-14 PROCEDURE — 2500000001 HC RX 250 WO HCPCS SELF ADMINISTERED DRUGS (ALT 637 FOR MEDICARE OP): Performed by: SURGERY

## 2024-09-14 RX ORDER — DOCUSATE SODIUM 100 MG/1
100 CAPSULE, LIQUID FILLED ORAL 2 TIMES DAILY
Status: DISCONTINUED | OUTPATIENT
Start: 2024-09-14 | End: 2024-09-20 | Stop reason: HOSPADM

## 2024-09-14 ASSESSMENT — COGNITIVE AND FUNCTIONAL STATUS - GENERAL
DAILY ACTIVITIY SCORE: 24
MOBILITY SCORE: 24

## 2024-09-14 ASSESSMENT — PAIN SCALES - GENERAL
PAINLEVEL_OUTOF10: 5 - MODERATE PAIN
PAINLEVEL_OUTOF10: 4

## 2024-09-14 NOTE — NURSING NOTE
"Patricio is watching tv declining anything for pain at this time \"just feeling a little gas\"  calll light in reach   "

## 2024-09-14 NOTE — PROGRESS NOTES
"Patricio Ayers is a 39 y.o. male on day 3 of admission presenting with Small bowel obstruction (Multi).    Subjective   Passing more flatus this morning.  Less distended.  NG output still high at 4000 ml.  He is taking in a decent amount of water and ice.        Objective     Physical Exam  Vitals and nursing note reviewed.   Constitutional:       Appearance: Normal appearance.   HENT:      Head: Normocephalic and atraumatic.   Cardiovascular:      Rate and Rhythm: Normal rate and regular rhythm.   Pulmonary:      Effort: Pulmonary effort is normal. No respiratory distress.   Abdominal:      General: Bowel sounds are normal. There is no distension.      Palpations: Abdomen is soft.      Tenderness: There is no abdominal tenderness.      Comments: Midline incision with dressing. Shadowing on inferior aspect of dressing, otherwise CDI. Lap incisions with bandaids.     NGT in place to LIWS with some bilious output   Musculoskeletal:         General: No swelling or tenderness.   Skin:     General: Skin is warm and dry.   Neurological:      Mental Status: He is alert and oriented to person, place, and time.         Last Recorded Vitals  Blood pressure 121/85, pulse 106, temperature 36.7 °C (98.1 °F), temperature source Temporal, resp. rate 18, height 1.88 m (6' 2\"), weight 115 kg (254 lb 6.4 oz), SpO2 96%.  Intake/Output last 3 Shifts:  I/O last 3 completed shifts:  In: 3946.3 (34.2 mL/kg) [P.O.:540; I.V.:2881.3 (25 mL/kg); IV Piggyback:525]  Out: 6700 (58.1 mL/kg) [Urine:1050 (0.3 mL/kg/hr); Emesis/NG output:5650]  Weight: 115.4 kg     Relevant Results  Lab Results   Component Value Date    GLUCOSE 133 (H) 09/14/2024    CALCIUM 9.7 09/14/2024     09/14/2024    K 3.8 09/14/2024    CO2 29 09/14/2024    CL 98 09/14/2024    BUN 17 09/14/2024    CREATININE 0.70 09/14/2024     Lab Results   Component Value Date    WBC 8.4 09/14/2024    HGB 14.3 09/14/2024    HCT 42.9 09/14/2024    MCV 87 09/14/2024     09/14/2024 "     Lab Results   Component Value Date    ALT 28 09/14/2024    AST 15 09/14/2024    ALKPHOS 106 09/14/2024    BILITOT 1.9 (H) 09/14/2024         Assessment/Plan   Assessment & Plan  Small bowel obstruction (Multi)  Patient with past history of ventral hernia causing bowel obstruction in 2023 requiring surgical repair.  NG tube inserted overnight.  Patient accidentally removed tube at some point in the emergency department.  I replaced the tube.  Output is thin, dark green.   Continue NG tube to low intermittent wall suction.  N.p.o., ice chips okay.  Generalized abdominal pain  There is a bowel containing hernia with a transition point in the mid abdomen causing complete small bowel obstruction- patient has been placed on the schedule for tomorrow for a hernia repair, but we will give him some time to decompress with NG tube today.  Would also like to further evaluate the possibility of cholecystitis with evidence of cholelithiasis and gallbladder wall thickening on imaging,  Liver enzymes slightly elevated.  Ultrasound of the gallbladder is ordered and pending to be completed.  Ventral hernia with obstruction and without gangrene    9/14/24: Clamp NG and start some CLD.  If nauseated will place him back to NG suction.  Ambulate.  Await return of more bowel function.      9/13/24: continue ngt to LIWS. DVT and GI prophylaxis. Ambulate. PT, OT, IS. Cbc and lfts daily.     9/12: Patient taken for OR yesterday, findings as follows: Recurrent, incarcerated ventral hernia.  Extensive inter-loop adhesions.  30 cm section of small bowel with multiple interloop adhesions with accordioning of the small bowel resected and re-anastomosed   Patient is now postop day 1 ventral hernia repair.  He has not started passing any flatus yet so we will continue NG tube to low intermittent suction until he starts to have bowel function.  For now maintain n.p.o. status.  IV fluids infusing.  Patient also got a liter of IV fluids this  morning as he was tachycardic overnight as well as febrile earlier this morning.  Patient denies feeling feverish or having the chills.  Continue to trend CBC and LFTs daily.  Will continue IV fluids for now.  If patient becomes febrile or clinically worsens, will complete sepsis workup.  D/W Dr. Abhinav CHURCH spent 15 minutes in the professional and overall care of this patient.      Gabino La MD

## 2024-09-14 NOTE — NURSING NOTE
BSSR complete Patricio up in chair  ng to LIWS voices no concerns at this time call light in reach

## 2024-09-15 VITALS
HEART RATE: 88 BPM | OXYGEN SATURATION: 95 % | BODY MASS INDEX: 32.65 KG/M2 | RESPIRATION RATE: 18 BRPM | HEIGHT: 74 IN | TEMPERATURE: 98.2 F | WEIGHT: 254.4 LBS | SYSTOLIC BLOOD PRESSURE: 105 MMHG | DIASTOLIC BLOOD PRESSURE: 66 MMHG

## 2024-09-15 LAB
ALBUMIN SERPL-MCNC: 3.3 G/DL (ref 3.5–5)
ALP BLD-CCNC: 121 U/L (ref 35–125)
ALT SERPL-CCNC: 23 U/L (ref 5–40)
ANION GAP SERPL CALC-SCNC: 12 MMOL/L
AST SERPL-CCNC: 15 U/L (ref 5–40)
BILIRUB SERPL-MCNC: 0.7 MG/DL (ref 0.1–1.2)
BUN SERPL-MCNC: 11 MG/DL (ref 8–25)
CALCIUM SERPL-MCNC: 8.8 MG/DL (ref 8.5–10.4)
CHLORIDE SERPL-SCNC: 101 MMOL/L (ref 97–107)
CO2 SERPL-SCNC: 27 MMOL/L (ref 24–31)
CREAT SERPL-MCNC: 0.6 MG/DL (ref 0.4–1.6)
EGFRCR SERPLBLD CKD-EPI 2021: >90 ML/MIN/1.73M*2
ERYTHROCYTE [DISTWIDTH] IN BLOOD BY AUTOMATED COUNT: 12.1 % (ref 11.5–14.5)
GLUCOSE SERPL-MCNC: 127 MG/DL (ref 65–99)
HCT VFR BLD AUTO: 38.5 % (ref 41–52)
HGB BLD-MCNC: 12.8 G/DL (ref 13.5–17.5)
MCH RBC QN AUTO: 28.8 PG (ref 26–34)
MCHC RBC AUTO-ENTMCNC: 33.2 G/DL (ref 32–36)
MCV RBC AUTO: 87 FL (ref 80–100)
NRBC BLD-RTO: 0 /100 WBCS (ref 0–0)
PLATELET # BLD AUTO: 213 X10*3/UL (ref 150–450)
POTASSIUM SERPL-SCNC: 3.2 MMOL/L (ref 3.4–5.1)
PROT SERPL-MCNC: 6.7 G/DL (ref 5.9–7.9)
RBC # BLD AUTO: 4.45 X10*6/UL (ref 4.5–5.9)
SODIUM SERPL-SCNC: 140 MMOL/L (ref 133–145)
WBC # BLD AUTO: 7.6 X10*3/UL (ref 4.4–11.3)

## 2024-09-15 PROCEDURE — 2500000004 HC RX 250 GENERAL PHARMACY W/ HCPCS (ALT 636 FOR OP/ED): Performed by: SURGERY

## 2024-09-15 PROCEDURE — 99024 POSTOP FOLLOW-UP VISIT: CPT | Performed by: SURGERY

## 2024-09-15 PROCEDURE — 80053 COMPREHEN METABOLIC PANEL: CPT | Performed by: SURGERY

## 2024-09-15 PROCEDURE — 85027 COMPLETE CBC AUTOMATED: CPT | Performed by: SURGERY

## 2024-09-15 PROCEDURE — 1200000002 HC GENERAL ROOM WITH TELEMETRY DAILY

## 2024-09-15 PROCEDURE — 36415 COLL VENOUS BLD VENIPUNCTURE: CPT | Performed by: SURGERY

## 2024-09-15 PROCEDURE — 2500000001 HC RX 250 WO HCPCS SELF ADMINISTERED DRUGS (ALT 637 FOR MEDICARE OP): Performed by: SURGERY

## 2024-09-15 RX ORDER — POTASSIUM CHLORIDE 14.9 MG/ML
20 INJECTION INTRAVENOUS
Status: COMPLETED | OUTPATIENT
Start: 2024-09-15 | End: 2024-09-15

## 2024-09-15 RX ORDER — ACETAMINOPHEN 325 MG/1
650 TABLET ORAL EVERY 6 HOURS PRN
Status: DISCONTINUED | OUTPATIENT
Start: 2024-09-15 | End: 2024-09-20 | Stop reason: HOSPADM

## 2024-09-15 RX ORDER — DEXTROSE MONOHYDRATE, SODIUM CHLORIDE, AND POTASSIUM CHLORIDE 50; 1.49; 4.5 G/1000ML; G/1000ML; G/1000ML
75 INJECTION, SOLUTION INTRAVENOUS CONTINUOUS
Status: DISCONTINUED | OUTPATIENT
Start: 2024-09-15 | End: 2024-09-20 | Stop reason: HOSPADM

## 2024-09-15 RX ORDER — OXYCODONE HYDROCHLORIDE 5 MG/1
5 TABLET ORAL EVERY 6 HOURS PRN
Status: DISCONTINUED | OUTPATIENT
Start: 2024-09-15 | End: 2024-09-20 | Stop reason: HOSPADM

## 2024-09-15 ASSESSMENT — COGNITIVE AND FUNCTIONAL STATUS - GENERAL
MOBILITY SCORE: 24
DAILY ACTIVITIY SCORE: 24
MOBILITY SCORE: 24
DAILY ACTIVITIY SCORE: 24

## 2024-09-15 ASSESSMENT — PAIN SCALES - GENERAL
PAINLEVEL_OUTOF10: 2
PAINLEVEL_OUTOF10: 3

## 2024-09-15 NOTE — NURSING NOTE
BSSR complete Patricio is resting laying on right side  arouses easily but went right back to sleep  no concerns voiced call light in reach

## 2024-09-15 NOTE — NURSING NOTE
Patricio is up in chair watching football and eating dinner and watching football call light in reach

## 2024-09-15 NOTE — PROGRESS NOTES
"Patricio Ayers is a 39 y.o. male on day 4 of admission presenting with Small bowel obstruction (Multi).    Subjective   Multiple bowel movements yesterday and this morning.  NG removed, denies nausea.  Minimal abdominal pain.  He is ambulating well.        Objective     Physical Exam  Vitals and nursing note reviewed.   Constitutional:       Appearance: Normal appearance.   HENT:      Head: Normocephalic and atraumatic.   Cardiovascular:      Rate and Rhythm: Normal rate and regular rhythm.   Pulmonary:      Effort: Pulmonary effort is normal. No respiratory distress.   Abdominal:      General: Bowel sounds are normal. There is no distension.      Palpations: Abdomen is soft.      Tenderness: There is no abdominal tenderness.      Comments: Midline incision with dressing. Shadowing on inferior aspect of dressing, otherwise CDI. Lap incisions with bandaids.     NGT in place to LIWS with some bilious output   Musculoskeletal:         General: No swelling or tenderness.   Skin:     General: Skin is warm and dry.   Neurological:      Mental Status: He is alert and oriented to person, place, and time.         Last Recorded Vitals  Blood pressure 114/68, pulse 98, temperature 36.3 °C (97.3 °F), temperature source Temporal, resp. rate 18, height 1.88 m (6' 2\"), weight 115 kg (254 lb 6.4 oz), SpO2 92%.  Intake/Output last 3 Shifts:  I/O last 3 completed shifts:  In: 4812.5 (41.7 mL/kg) [P.O.:2000; I.V.:812.5 (7 mL/kg); IV Piggyback:2000]  Out: 3700 (32.1 mL/kg) [Urine:600 (0.1 mL/kg/hr); Emesis/NG output:3100]  Weight: 115.4 kg     Relevant Results  Lab Results   Component Value Date    GLUCOSE 127 (H) 09/15/2024    CALCIUM 8.8 09/15/2024     09/15/2024    K 3.2 (L) 09/15/2024    CO2 27 09/15/2024     09/15/2024    BUN 11 09/15/2024    CREATININE 0.60 09/15/2024     Lab Results   Component Value Date    WBC 7.6 09/15/2024    HGB 12.8 (L) 09/15/2024    HCT 38.5 (L) 09/15/2024    MCV 87 09/15/2024     " 09/15/2024     Lab Results   Component Value Date    ALT 23 09/15/2024    AST 15 09/15/2024    ALKPHOS 121 09/15/2024    BILITOT 0.7 09/15/2024         Assessment/Plan   Assessment & Plan  Small bowel obstruction (Multi)  Patient with past history of ventral hernia causing bowel obstruction in 2023 requiring surgical repair.  NG tube inserted overnight.  Patient accidentally removed tube at some point in the emergency department.  I replaced the tube.  Output is thin, dark green.   Continue NG tube to low intermittent wall suction.  N.p.o., ice chips okay.  Generalized abdominal pain  There is a bowel containing hernia with a transition point in the mid abdomen causing complete small bowel obstruction- patient has been placed on the schedule for tomorrow for a hernia repair, but we will give him some time to decompress with NG tube today.  Would also like to further evaluate the possibility of cholecystitis with evidence of cholelithiasis and gallbladder wall thickening on imaging,  Liver enzymes slightly elevated.  Ultrasound of the gallbladder is ordered and pending to be completed.  Ventral hernia with obstruction and without gangrene    9/15/24: NG removed, FLD.  Will give soft diet tomorrow morning and if tolerating then may discharge tomorrow.      9/14/24: Clamp NG and start some CLD.  If nauseated will place him back to NG suction.  Ambulate.  Await return of more bowel function.      9/13/24: continue ngt to LIWS. DVT and GI prophylaxis. Ambulate. PT, OT, IS. Cbc and lfts daily.     9/12: Patient taken for OR yesterday, findings as follows: Recurrent, incarcerated ventral hernia.  Extensive inter-loop adhesions.  30 cm section of small bowel with multiple interloop adhesions with accordioning of the small bowel resected and re-anastomosed   Patient is now postop day 1 ventral hernia repair.  He has not started passing any flatus yet so we will continue NG tube to low intermittent suction until he starts to  have bowel function.  For now maintain n.p.o. status.  IV fluids infusing.  Patient also got a liter of IV fluids this morning as he was tachycardic overnight as well as febrile earlier this morning.  Patient denies feeling feverish or having the chills.  Continue to trend CBC and LFTs daily.  Will continue IV fluids for now.  If patient becomes febrile or clinically worsens, will complete sepsis workup.  D/W Dr. Abhinav CHURCH spent 15 minutes in the professional and overall care of this patient.      Gabino La MD

## 2024-09-16 PROBLEM — R10.84 GENERALIZED ABDOMINAL PAIN: Status: RESOLVED | Noted: 2024-09-09 | Resolved: 2024-09-16

## 2024-09-16 PROBLEM — K56.609 SMALL BOWEL OBSTRUCTION (MULTI): Status: RESOLVED | Noted: 2024-09-10 | Resolved: 2024-09-16

## 2024-09-16 PROBLEM — K43.6 VENTRAL HERNIA WITH OBSTRUCTION AND WITHOUT GANGRENE: Status: RESOLVED | Noted: 2024-09-09 | Resolved: 2024-09-16

## 2024-09-16 LAB
ALBUMIN SERPL-MCNC: 3.2 G/DL (ref 3.5–5)
ALP BLD-CCNC: 123 U/L (ref 35–125)
ALT SERPL-CCNC: 26 U/L (ref 5–40)
ANION GAP SERPL CALC-SCNC: 13 MMOL/L
AST SERPL-CCNC: 22 U/L (ref 5–40)
BILIRUB SERPL-MCNC: 0.5 MG/DL (ref 0.1–1.2)
BUN SERPL-MCNC: 8 MG/DL (ref 8–25)
CALCIUM SERPL-MCNC: 8.8 MG/DL (ref 8.5–10.4)
CHLORIDE SERPL-SCNC: 103 MMOL/L (ref 97–107)
CO2 SERPL-SCNC: 24 MMOL/L (ref 24–31)
CREAT SERPL-MCNC: 0.6 MG/DL (ref 0.4–1.6)
EGFRCR SERPLBLD CKD-EPI 2021: >90 ML/MIN/1.73M*2
ERYTHROCYTE [DISTWIDTH] IN BLOOD BY AUTOMATED COUNT: 12.2 % (ref 11.5–14.5)
GLUCOSE SERPL-MCNC: 134 MG/DL (ref 65–99)
HCT VFR BLD AUTO: 39.2 % (ref 41–52)
HGB BLD-MCNC: 13.1 G/DL (ref 13.5–17.5)
HOLD SPECIMEN: NORMAL
LABORATORY COMMENT REPORT: NORMAL
MCH RBC QN AUTO: 28.5 PG (ref 26–34)
MCHC RBC AUTO-ENTMCNC: 33.4 G/DL (ref 32–36)
MCV RBC AUTO: 85 FL (ref 80–100)
NRBC BLD-RTO: 0 /100 WBCS (ref 0–0)
PATH REPORT.FINAL DX SPEC: NORMAL
PATH REPORT.GROSS SPEC: NORMAL
PATH REPORT.RELEVANT HX SPEC: NORMAL
PATH REPORT.TOTAL CANCER: NORMAL
PLATELET # BLD AUTO: 264 X10*3/UL (ref 150–450)
POTASSIUM SERPL-SCNC: 3.3 MMOL/L (ref 3.4–5.1)
PROT SERPL-MCNC: 6.7 G/DL (ref 5.9–7.9)
RBC # BLD AUTO: 4.6 X10*6/UL (ref 4.5–5.9)
SODIUM SERPL-SCNC: 140 MMOL/L (ref 133–145)
WBC # BLD AUTO: 8.7 X10*3/UL (ref 4.4–11.3)

## 2024-09-16 PROCEDURE — 36415 COLL VENOUS BLD VENIPUNCTURE: CPT | Performed by: SURGERY

## 2024-09-16 PROCEDURE — 80053 COMPREHEN METABOLIC PANEL: CPT | Performed by: SURGERY

## 2024-09-16 PROCEDURE — 1200000002 HC GENERAL ROOM WITH TELEMETRY DAILY

## 2024-09-16 PROCEDURE — 2500000001 HC RX 250 WO HCPCS SELF ADMINISTERED DRUGS (ALT 637 FOR MEDICARE OP): Performed by: SURGERY

## 2024-09-16 PROCEDURE — 2500000004 HC RX 250 GENERAL PHARMACY W/ HCPCS (ALT 636 FOR OP/ED): Performed by: SURGERY

## 2024-09-16 PROCEDURE — 85027 COMPLETE CBC AUTOMATED: CPT | Performed by: SURGERY

## 2024-09-16 RX ORDER — ACETAMINOPHEN 325 MG/1
650 TABLET ORAL EVERY 6 HOURS PRN
Qty: 30 TABLET | Refills: 0 | Status: SHIPPED | OUTPATIENT
Start: 2024-09-16 | End: 2024-09-23

## 2024-09-16 ASSESSMENT — COGNITIVE AND FUNCTIONAL STATUS - GENERAL
MOBILITY SCORE: 24
MOBILITY SCORE: 24
DAILY ACTIVITIY SCORE: 24
DAILY ACTIVITIY SCORE: 24

## 2024-09-16 ASSESSMENT — PAIN SCALES - GENERAL
PAINLEVEL_OUTOF10: 0 - NO PAIN
PAINLEVEL_OUTOF10: 4
PAINLEVEL_OUTOF10: 0 - NO PAIN

## 2024-09-16 ASSESSMENT — PAIN DESCRIPTION - DESCRIPTORS: DESCRIPTORS: ACHING

## 2024-09-16 ASSESSMENT — PAIN DESCRIPTION - LOCATION: LOCATION: ABDOMEN

## 2024-09-16 ASSESSMENT — PAIN - FUNCTIONAL ASSESSMENT: PAIN_FUNCTIONAL_ASSESSMENT: 0-10

## 2024-09-16 ASSESSMENT — PAIN DESCRIPTION - ORIENTATION: ORIENTATION: MID

## 2024-09-16 NOTE — PROGRESS NOTES
"Patricio Ayers is a 39 y.o. male on day 5 of admission presenting with Small bowel obstruction (Multi).    Subjective   Multiple bowel movements yesterday and this morning.  NG removed, denies nausea.  Minimal abdominal pain.  He is ambulating well.  States he is ready to go home, tolerating diet.       Objective     Physical Exam  Vitals and nursing note reviewed.   Constitutional:       Appearance: Normal appearance.   HENT:      Head: Normocephalic and atraumatic.      Nose: Nose normal.      Mouth/Throat:      Mouth: Mucous membranes are moist.   Eyes:      Extraocular Movements: Extraocular movements intact.   Cardiovascular:      Rate and Rhythm: Normal rate and regular rhythm.   Pulmonary:      Effort: Pulmonary effort is normal. No respiratory distress.   Abdominal:      General: Bowel sounds are normal. There is no distension.      Palpations: Abdomen is soft.      Tenderness: There is no abdominal tenderness.      Comments: Midline incision with dressing. Shadowing on inferior aspect of dressing, otherwise CDI. Lap incisions with bandaids.     NGT in place to LIWS with some bilious output   Musculoskeletal:         General: No swelling or tenderness.   Skin:     General: Skin is warm and dry.   Neurological:      General: No focal deficit present.      Mental Status: He is alert and oriented to person, place, and time.   Psychiatric:         Mood and Affect: Mood normal.         Behavior: Behavior normal.         Last Recorded Vitals  Blood pressure 110/72, pulse 85, temperature 36.5 °C (97.7 °F), temperature source Oral, resp. rate 20, height 1.88 m (6' 2\"), weight 115 kg (254 lb 6.4 oz), SpO2 97%.  Intake/Output last 3 Shifts:  I/O last 3 completed shifts:  In: 3978.8 (34.5 mL/kg) [P.O.:2250; IV Piggyback:1728.8]  Out: 0 (0 mL/kg)   Weight: 115.4 kg     Relevant Results  Lab Results   Component Value Date    GLUCOSE 134 (H) 09/16/2024    CALCIUM 8.8 09/16/2024     09/16/2024    K 3.3 (L) 09/16/2024 "    CO2 24 09/16/2024     09/16/2024    BUN 8 09/16/2024    CREATININE 0.60 09/16/2024     Lab Results   Component Value Date    WBC 8.7 09/16/2024    HGB 13.1 (L) 09/16/2024    HCT 39.2 (L) 09/16/2024    MCV 85 09/16/2024     09/16/2024     Lab Results   Component Value Date    ALT 26 09/16/2024    AST 22 09/16/2024    ALKPHOS 123 09/16/2024    BILITOT 0.5 09/16/2024         Assessment/Plan   Assessment & Plan  Small bowel obstruction (Multi)  Patient with past history of ventral hernia causing bowel obstruction in 2023 requiring surgical repair.  NG tube inserted overnight.  Patient accidentally removed tube at some point in the emergency department.  I replaced the tube.  Output is thin, dark green.   Continue NG tube to low intermittent wall suction.  N.p.o., ice chips okay.  Generalized abdominal pain  There is a bowel containing hernia with a transition point in the mid abdomen causing complete small bowel obstruction- patient has been placed on the schedule for tomorrow for a hernia repair, but we will give him some time to decompress with NG tube today.  Would also like to further evaluate the possibility of cholecystitis with evidence of cholelithiasis and gallbladder wall thickening on imaging,  Liver enzymes slightly elevated.  Ultrasound of the gallbladder is ordered and pending to be completed.  Ventral hernia with obstruction and without gangrene    9/16/24: Soft diet today. Will check in later, if tolerating, will discharge.     9/15/24: NG removed, FLD.  Will give soft diet tomorrow morning and if tolerating then may discharge tomorrow.      9/14/24: Clamp NG and start some CLD.  If nauseated will place him back to NG suction.  Ambulate.  Await return of more bowel function.      9/13/24: continue ngt to LIWS. DVT and GI prophylaxis. Ambulate. PT, OT, IS. Cbc and lfts daily.     9/12: Patient taken for OR yesterday, findings as follows: Recurrent, incarcerated ventral hernia.  Extensive  inter-loop adhesions.  30 cm section of small bowel with multiple interloop adhesions with accordioning of the small bowel resected and re-anastomosed   Patient is now postop day 1 ventral hernia repair.  He has not started passing any flatus yet so we will continue NG tube to low intermittent suction until he starts to have bowel function.  For now maintain n.p.o. status.  IV fluids infusing.  Patient also got a liter of IV fluids this morning as he was tachycardic overnight as well as febrile earlier this morning.  Patient denies feeling feverish or having the chills.  Continue to trend CBC and LFTs daily.  Will continue IV fluids for now.  If patient becomes febrile or clinically worsens, will complete sepsis workup.  D/W Dr. Abhinav CHURCH spent 15 minutes in the professional and overall care of this patient.      Kianna Ang PA-C

## 2024-09-16 NOTE — DISCHARGE SUMMARY
Discharge Diagnosis  Small bowel obstruction (Multi)    Issues Requiring Follow-Up  Ventral hernia repair     Test Results Pending At Discharge  Pending Labs       No current pending labs.            Hospital Course   Patient presented to  for SBO due to recurrent incarcerated ventral hernia. Patient taken to OR 9/11. Tolerated procedure well. Patient with postoperative ileus, NGT reinserted. Patient has been passing flatus and Bms. Tolerating diet. Feeling well today Patient to follow up in office with Dr. La for staple removal.     Pertinent Physical Exam At Time of Discharge  Physical Exam  Constitutional:       Appearance: Normal appearance.   HENT:      Head: Normocephalic and atraumatic.      Nose: Nose normal.      Mouth/Throat:      Mouth: Mucous membranes are moist.   Eyes:      Extraocular Movements: Extraocular movements intact.   Cardiovascular:      Rate and Rhythm: Normal rate.   Pulmonary:      Effort: No respiratory distress.   Abdominal:      General: There is no distension.      Palpations: Abdomen is soft.      Tenderness: There is no abdominal tenderness.      Comments: Surgical incisions CDI with staples   Musculoskeletal:         General: Normal range of motion.      Cervical back: Normal range of motion.   Skin:     General: Skin is warm and dry.   Neurological:      General: No focal deficit present.      Mental Status: He is alert and oriented to person, place, and time.   Psychiatric:         Mood and Affect: Mood normal.         Behavior: Behavior normal.         Home Medications     Medication List      START taking these medications     acetaminophen 325 mg tablet; Commonly known as: Tylenol; Take 2 tablets   (650 mg) by mouth every 6 hours if needed for moderate pain (4 - 6) for up   to 7 days.     CONTINUE taking these medications     D3-2000 ORAL   Fish OiL 1,000 (120-180) mg capsule; Generic drug: omega 3-dha-epa-fish   oil   ibuprofen 200 mg tablet       Outpatient  Follow-Up  No future appointments.    Kianna Ang PA-C

## 2024-09-16 NOTE — NURSING NOTE
Message sent to holly woodard that pt had small emesis after couple bites of lunch, pt feels better but I encouraged him to ambulate in hallway, pt states he will

## 2024-09-17 ENCOUNTER — APPOINTMENT (OUTPATIENT)
Dept: RADIOLOGY | Facility: HOSPITAL | Age: 39
End: 2024-09-17
Payer: MEDICARE

## 2024-09-17 ENCOUNTER — PHARMACY VISIT (OUTPATIENT)
Dept: PHARMACY | Facility: CLINIC | Age: 39
End: 2024-09-17

## 2024-09-17 LAB
ALBUMIN SERPL-MCNC: 3.7 G/DL (ref 3.5–5)
ALP BLD-CCNC: 152 U/L (ref 35–125)
ALT SERPL-CCNC: 33 U/L (ref 5–40)
ANION GAP SERPL CALC-SCNC: 13 MMOL/L
AST SERPL-CCNC: 24 U/L (ref 5–40)
BILIRUB SERPL-MCNC: 0.5 MG/DL (ref 0.1–1.2)
BUN SERPL-MCNC: 8 MG/DL (ref 8–25)
CALCIUM SERPL-MCNC: 9.6 MG/DL (ref 8.5–10.4)
CHLORIDE SERPL-SCNC: 99 MMOL/L (ref 97–107)
CO2 SERPL-SCNC: 23 MMOL/L (ref 24–31)
CREAT SERPL-MCNC: 0.6 MG/DL (ref 0.4–1.6)
EGFRCR SERPLBLD CKD-EPI 2021: >90 ML/MIN/1.73M*2
ERYTHROCYTE [DISTWIDTH] IN BLOOD BY AUTOMATED COUNT: 12.1 % (ref 11.5–14.5)
GLUCOSE SERPL-MCNC: 181 MG/DL (ref 65–99)
HCT VFR BLD AUTO: 43.4 % (ref 41–52)
HGB BLD-MCNC: 14.2 G/DL (ref 13.5–17.5)
MCH RBC QN AUTO: 28.2 PG (ref 26–34)
MCHC RBC AUTO-ENTMCNC: 32.7 G/DL (ref 32–36)
MCV RBC AUTO: 86 FL (ref 80–100)
NRBC BLD-RTO: 0 /100 WBCS (ref 0–0)
PLATELET # BLD AUTO: 318 X10*3/UL (ref 150–450)
POTASSIUM SERPL-SCNC: 3.7 MMOL/L (ref 3.4–5.1)
PROT SERPL-MCNC: 7.8 G/DL (ref 5.9–7.9)
RBC # BLD AUTO: 5.03 X10*6/UL (ref 4.5–5.9)
SODIUM SERPL-SCNC: 135 MMOL/L (ref 133–145)
WBC # BLD AUTO: 11.6 X10*3/UL (ref 4.4–11.3)

## 2024-09-17 PROCEDURE — 74019 RADEX ABDOMEN 2 VIEWS: CPT | Performed by: RADIOLOGY

## 2024-09-17 PROCEDURE — 74018 RADEX ABDOMEN 1 VIEW: CPT

## 2024-09-17 PROCEDURE — 2500000004 HC RX 250 GENERAL PHARMACY W/ HCPCS (ALT 636 FOR OP/ED): Performed by: SURGERY

## 2024-09-17 PROCEDURE — 74018 RADEX ABDOMEN 1 VIEW: CPT | Performed by: RADIOLOGY

## 2024-09-17 PROCEDURE — RXMED WILLOW AMBULATORY MEDICATION CHARGE

## 2024-09-17 PROCEDURE — 36415 COLL VENOUS BLD VENIPUNCTURE: CPT | Performed by: SURGERY

## 2024-09-17 PROCEDURE — 85027 COMPLETE CBC AUTOMATED: CPT | Performed by: SURGERY

## 2024-09-17 PROCEDURE — 80053 COMPREHEN METABOLIC PANEL: CPT | Performed by: SURGERY

## 2024-09-17 PROCEDURE — 74019 RADEX ABDOMEN 2 VIEWS: CPT

## 2024-09-17 PROCEDURE — 1200000002 HC GENERAL ROOM WITH TELEMETRY DAILY

## 2024-09-17 ASSESSMENT — COGNITIVE AND FUNCTIONAL STATUS - GENERAL
DAILY ACTIVITIY SCORE: 24
DAILY ACTIVITIY SCORE: 24
MOBILITY SCORE: 24
MOBILITY SCORE: 24

## 2024-09-17 ASSESSMENT — PAIN SCALES - GENERAL
PAINLEVEL_OUTOF10: 2
PAINLEVEL_OUTOF10: 0 - NO PAIN
PAINLEVEL_OUTOF10: 0 - NO PAIN

## 2024-09-17 ASSESSMENT — PAIN - FUNCTIONAL ASSESSMENT
PAIN_FUNCTIONAL_ASSESSMENT: 0-10
PAIN_FUNCTIONAL_ASSESSMENT: 0-10

## 2024-09-17 NOTE — PROGRESS NOTES
"Patricio Ayers is a 39 y.o. male on day 6 of admission presenting with Small bowel obstruction (Multi).    Subjective   Pt. reports vomiting after trying to eat his breakfast this morning. He states he was able to tolerate his dinner last night but did not tolerate his lunch. Pt. states he has been passing gas with only a few \"pebble-sized\" BM. He states he has been up and ambulating. He reports mild abdominal discomfort. He denies fever, sweats, and chills.       Objective     Physical Exam  Vitals and nursing note reviewed.   Constitutional:       Appearance: Normal appearance.   HENT:      Head: Normocephalic and atraumatic.      Nose: Nose normal.      Mouth/Throat:      Mouth: Mucous membranes are moist.   Eyes:      Extraocular Movements: Extraocular movements intact.   Cardiovascular:      Rate and Rhythm: Normal rate.   Pulmonary:      Effort: Pulmonary effort is normal. No respiratory distress.   Abdominal:      General: Bowel sounds are normal.      Palpations: Abdomen is soft.      Tenderness: There is abdominal tenderness.      Comments: Mild tenderness and pain in RUQ    Musculoskeletal:         General: No swelling or tenderness.   Skin:     General: Skin is warm and dry.   Neurological:      General: No focal deficit present.      Mental Status: He is alert and oriented to person, place, and time.   Psychiatric:         Mood and Affect: Mood normal.         Behavior: Behavior normal.         Last Recorded Vitals  Blood pressure 115/79, pulse 85, temperature 36.8 °C (98.2 °F), temperature source Oral, resp. rate 17, height 1.88 m (6' 2\"), weight 117 kg (258 lb 3.2 oz), SpO2 93%.  Intake/Output last 3 Shifts:  I/O last 3 completed shifts:  In: 4071.3 (34.8 mL/kg) [P.O.:1350; IV Piggyback:2721.3]  Out: - (0 mL/kg)   Weight: 117.1 kg     Relevant Results  Lab Results   Component Value Date    GLUCOSE 181 (H) 09/17/2024    CALCIUM 9.6 09/17/2024     09/17/2024    K 3.7 09/17/2024    CO2 23 (L) " 09/17/2024    CL 99 09/17/2024    BUN 8 09/17/2024    CREATININE 0.60 09/17/2024     Lab Results   Component Value Date    WBC 11.6 (H) 09/17/2024    HGB 14.2 09/17/2024    HCT 43.4 09/17/2024    MCV 86 09/17/2024     09/17/2024     Lab Results   Component Value Date    ALT 33 09/17/2024    AST 24 09/17/2024    ALKPHOS 152 (H) 09/17/2024    BILITOT 0.5 09/17/2024         Assessment/Plan   Assessment & Plan    9/17/24: Pt is having trouble tolerating diet with report of an episode of vomiting after trying breakfast this morning. Continue to monitor with consideration of CLD if still not tolerating diet. Continue ambulation. Monitor for BMs. Discussed with Dr. La.     9/16/24: Soft diet today. Will check in later, if tolerating, will discharge.     9/15/24: NG removed, FLD.  Will give soft diet tomorrow morning and if tolerating then may discharge tomorrow.      9/14/24: Clamp NG and start some CLD.  If nauseated will place him back to NG suction.  Ambulate.  Await return of more bowel function.      9/13/24: continue ngt to LIWS. DVT and GI prophylaxis. Ambulate. PT, OT, IS. Cbc and lfts daily.     9/12: Patient taken for OR yesterday, findings as follows: Recurrent, incarcerated ventral hernia.  Extensive inter-loop adhesions.  30 cm section of small bowel with multiple interloop adhesions with accordioning of the small bowel resected and re-anastomosed   Patient is now postop day 1 ventral hernia repair.  He has not started passing any flatus yet so we will continue NG tube to low intermittent suction until he starts to have bowel function.  For now maintain n.p.o. status.  IV fluids infusing.  Patient also got a liter of IV fluids this morning as he was tachycardic overnight as well as febrile earlier this morning.  Patient denies feeling feverish or having the chills.  Continue to trend CBC and LFTs daily.  Will continue IV fluids for now.  If patient becomes febrile or clinically worsens, will  complete sepsis workup.  D/W Dr. Abhinav CHURCH spent 15 minutes in the professional and overall care of this patient.      TODD STEINBERG I was present with the PA student who participated in the documentation of this note. I have personally seen and re-examined the patient and performed the medical decision-making components (assessment and plan of care). I have reviewed the PA student documentation and verified the findings in the note as written with additions or exceptions as stated in the body of this note.    Kianna Ang PA-C

## 2024-09-17 NOTE — NURSING NOTE
Pt resting in bed. No complaints or concerns. Call light within reach.  Pt continues on IV fluids per order.  NG tube in place to LIWS.

## 2024-09-17 NOTE — NURSING NOTE
Pt A&O x3 currently resting in bed. No complaints or concerns. Call light within reach.  Pt continues on IV fluids per order.

## 2024-09-17 NOTE — NURSING NOTE
16 fr NG placed left nare @ 43 meters. Pt vomited large orange liquid while . Verified placed with bolus. Xray confirmation pending.  Intermittent suction.

## 2024-09-17 NOTE — PROGRESS NOTES
Care transitions not consulted, pt anticipated to return home at dc without skilled services needed. Please consult transitions as needed.      09/17/24 1410   Discharge Planning   Expected Discharge Disposition Home

## 2024-09-18 LAB
ALBUMIN SERPL BCP-MCNC: 3.1 G/DL (ref 3.4–5)
ALP SERPL-CCNC: 114 U/L (ref 33–120)
ALT SERPL W P-5'-P-CCNC: 38 U/L (ref 10–52)
ANION GAP SERPL CALCULATED.3IONS-SCNC: 9 MMOL/L (ref 10–20)
AST SERPL W P-5'-P-CCNC: 31 U/L (ref 9–39)
BILIRUB SERPL-MCNC: 0.5 MG/DL (ref 0–1.2)
BUN SERPL-MCNC: 8 MG/DL (ref 6–23)
CALCIUM SERPL-MCNC: 8.3 MG/DL (ref 8.6–10.3)
CHLORIDE SERPL-SCNC: 105 MMOL/L (ref 98–107)
CO2 SERPL-SCNC: 26 MMOL/L (ref 21–32)
CREAT SERPL-MCNC: 0.64 MG/DL (ref 0.5–1.3)
EGFRCR SERPLBLD CKD-EPI 2021: >90 ML/MIN/1.73M*2
ERYTHROCYTE [DISTWIDTH] IN BLOOD BY AUTOMATED COUNT: 12.2 % (ref 11.5–14.5)
GLUCOSE SERPL-MCNC: 125 MG/DL (ref 74–99)
HCT VFR BLD AUTO: 38.6 % (ref 41–52)
HGB BLD-MCNC: 12.7 G/DL (ref 13.5–17.5)
MCH RBC QN AUTO: 28.6 PG (ref 26–34)
MCHC RBC AUTO-ENTMCNC: 32.9 G/DL (ref 32–36)
MCV RBC AUTO: 87 FL (ref 80–100)
NRBC BLD-RTO: 0 /100 WBCS (ref 0–0)
PLATELET # BLD AUTO: 257 X10*3/UL (ref 150–450)
POTASSIUM SERPL-SCNC: 3.2 MMOL/L (ref 3.5–5.3)
PROT SERPL-MCNC: 6.7 G/DL (ref 6.4–8.2)
RBC # BLD AUTO: 4.44 X10*6/UL (ref 4.5–5.9)
SODIUM SERPL-SCNC: 137 MMOL/L (ref 136–145)
WBC # BLD AUTO: 7.3 X10*3/UL (ref 4.4–11.3)

## 2024-09-18 PROCEDURE — 2500000001 HC RX 250 WO HCPCS SELF ADMINISTERED DRUGS (ALT 637 FOR MEDICARE OP): Performed by: SURGERY

## 2024-09-18 PROCEDURE — 84075 ASSAY ALKALINE PHOSPHATASE: CPT | Performed by: SURGERY

## 2024-09-18 PROCEDURE — 2500000004 HC RX 250 GENERAL PHARMACY W/ HCPCS (ALT 636 FOR OP/ED): Performed by: NURSE PRACTITIONER

## 2024-09-18 PROCEDURE — 2500000004 HC RX 250 GENERAL PHARMACY W/ HCPCS (ALT 636 FOR OP/ED): Performed by: SURGERY

## 2024-09-18 PROCEDURE — 99024 POSTOP FOLLOW-UP VISIT: CPT | Performed by: NURSE PRACTITIONER

## 2024-09-18 PROCEDURE — 1200000002 HC GENERAL ROOM WITH TELEMETRY DAILY

## 2024-09-18 PROCEDURE — 85027 COMPLETE CBC AUTOMATED: CPT | Performed by: SURGERY

## 2024-09-18 PROCEDURE — 36415 COLL VENOUS BLD VENIPUNCTURE: CPT | Performed by: SURGERY

## 2024-09-18 RX ORDER — ENOXAPARIN SODIUM 100 MG/ML
40 INJECTION SUBCUTANEOUS EVERY 24 HOURS
Status: DISCONTINUED | OUTPATIENT
Start: 2024-09-18 | End: 2024-09-20 | Stop reason: HOSPADM

## 2024-09-18 RX ORDER — POTASSIUM CHLORIDE 14.9 MG/ML
20 INJECTION INTRAVENOUS
Status: COMPLETED | OUTPATIENT
Start: 2024-09-18 | End: 2024-09-18

## 2024-09-18 RX ORDER — METOCLOPRAMIDE HYDROCHLORIDE 5 MG/ML
10 INJECTION INTRAMUSCULAR; INTRAVENOUS EVERY 8 HOURS SCHEDULED
Status: DISCONTINUED | OUTPATIENT
Start: 2024-09-18 | End: 2024-09-20 | Stop reason: HOSPADM

## 2024-09-18 ASSESSMENT — PAIN SCALES - GENERAL: PAINLEVEL_OUTOF10: 0 - NO PAIN

## 2024-09-18 NOTE — NURSING NOTE
Patient requesting shower, IV was disconnected and wrapped. NG tube was clamped and disconnected from suction as well. Patient assisted to bathroom and given shower supplies as well as clean gown. Instructed to not get face wet so NG securement device did not come off, patient is agreeable to this denied any questions. Instructed to pull call cord if needed.

## 2024-09-18 NOTE — NURSING NOTE
Abd inc x 4  DSD applied staples intact well approximated  Patricio remains up in chair call light in reach

## 2024-09-18 NOTE — NURSING NOTE
Rounding on patient and still reporting tolerating diet well, denies any pain or nausea. No more BM since the one he had this morning but is still passing gas. Call light in reach   18

## 2024-09-18 NOTE — PROGRESS NOTES
Patricio Ayers is a 39 y.o. male on day 7 of admission presenting with Small bowel obstruction (Multi).    Subjective   Feels okay and overall better than yesterday.  Postop pain is okay and just intermittent and keeps getting better.  No nausea.  Passing gas.  Since seen yesterday had 5-8 bowel movements that vary in size that some were loose and some were soft.  Does not feel full or bloated.  Feels empty and hungry.  No angina, shortness of breath, cough, sputum, calf pain, bleeding, voiding problems.  Ambulating and using his I-S.     Objective     Vital signs in last 24 hours:  Temp:  [36.3 °C (97.3 °F)-36.9 °C (98.4 °F)] 36.6 °C (97.9 °F)  Heart Rate:  [89-99] 89  Resp:  [16-18] 16  BP: (100-121)/(56-83) 107/71  Heart Rate:  [89-99]   Temp:  [36.3 °C (97.3 °F)-36.9 °C (98.4 °F)]   Resp:  [16-18]   BP: (100-121)/(56-83)   SpO2:  [93 %-97 %]      Intake/Output last 3 Shifts:  I/O last 3 completed shifts:  In: 3021.3 (25.8 mL/kg) [P.O.:620; IV Piggyback:2401.3]  Out: 200 (1.7 mL/kg) [Emesis/NG output:200]  Weight: 117.1 kg     Physical Exam  In chair  No acute distress  Not septic appearing  Looks fine and comfortable  Alert and oriented  Heart regular-SV rhythm on the monitor with a rate of 86  Blood pressure not on the monitor  Lungs clear on room air  No edema  Abdomen soft, positive bowel sounds, very mildly distended, nontender, stapled incisions are all fine without any issues-dressing changed, binder on  NG with about 400 of yellowish in the canister    Scheduled medications  docusate sodium, 100 mg, oral, BID  metoclopramide, 10 mg, intravenous, q8h SHIRA  pantoprazole, 40 mg, intravenous, Daily  potassium chloride, 20 mEq, intravenous, q2h    Continuous medications  potassium xqyljmz-F2-5.45%NaCl, 75 mL/hr, Last Rate: 75 mL/hr (09/18/24 0904)    PRN medications  PRN medications: acetaminophen, benzocaine-menthol, HYDROmorphone, ondansetron, oxyCODONE, phenoL    Relevant Results  Results from last 7 days    Lab Units 09/18/24  0555 09/17/24  0542 09/16/24  0715   WBC AUTO x10*3/uL 7.3 11.6* 8.7   HEMOGLOBIN g/dL 12.7* 14.2 13.1*   HEMATOCRIT % 38.6* 43.4 39.2*   PLATELETS AUTO x10*3/uL 257 318 264      Results from last 7 days   Lab Units 09/18/24  0555 09/17/24  0542 09/16/24  0556   SODIUM mmol/L 137 135 140   POTASSIUM mmol/L 3.2* 3.7 3.3*   CHLORIDE mmol/L 105 99 103   CO2 mmol/L 26 23* 24   BUN mg/dL 8 8 8   CREATININE mg/dL 0.64 0.60 0.60   GLUCOSE mg/dL 125* 181* 134*   CALCIUM mg/dL 8.3* 9.6 8.8      XR abdomen 1 view    Result Date: 9/17/2024  Interpreted By:  Michelle Coffey, STUDY: XR ABDOMEN 1 VIEW 9/17/2024 2:50 pm   INDICATION: Signs/Symptoms:NGT placement   COMPARISON: None available.   ACCESSION NUMBER(S): [id number]   ORDERING CLINICIAN: JEANNE DE PAZ   TECHNIQUE: Single view abdomen   FINDINGS: Gas-filled dilated loops of small bowel demonstrated as seen on prior imaging with dilatation measuring up to 6.5 cm. NG tube is in the region of the distal esophagus. There is no evidence for free air.   S shaped scoliosis is demonstrated.             1. Small-bowel obstruction of concern with dilatation measuring up to 6.5 cm.   Signed by: Michelle Cofefy 9/17/2024 3:12 PM Dictation workstation:   [id number]    XR abdomen 2 views supine and erect or decub    Result Date: 9/17/2024  Interpreted By:  Lamin Root, STUDY: XR ABDOMEN 2 VIEWS SUPINE AND ERECT OR DECUB;  9/17/2024 11:48 am   INDICATION: Signs/Symptoms:SBO.     COMPARISON: None.   ACCESSION NUMBER(S): [id number]   ORDERING CLINICIAN: SKY CORTEZ   TECHNIQUE: Abdomen supine and upright views   FINDINGS: There is dilatation of several small bowel loops in the abdomen especially on the left concerning for small bowel obstruction   Lung bases are clear       Dilatation of small bowel loops concerning for underlying small bowel obstruction versus ileus. Consider CT for complete evaluation   MACRO: None   Signed by: Lamin Root  9/17/2024 12:34 PM Dictation workstation:   [id number]      Assessment/Plan   Generalized abdominal pain due to small bowel obstruction due to recurrent incarcerated ventral hernia with obstruction and without gangrene  Resolved-status post repair of ventral hernia laparoscopy, lysis of adhesions, small bowel resection 9/11  Pathology:  Segment of small bowel with focal mild nonspecific acute inflammation, vascular congestion, and subserosal fibrosis and hemorrhage    9/17 x-ray abdomen 2 views:  IMPRESSION:  Dilatation of small bowel loops concerning for underlying small bowel  obstruction versus ileus. Consider CT for complete evaluation    9/17 x-ray abdomen 1 view:  IMPRESSION:  1. Small-bowel obstruction of concern with dilatation measuring up to  6.5 cm.    Patient was having trouble tolerating his diet and reported an episode of vomiting after trying his breakfast 9/17 morning.  NG was replaced 9/17 at 1340    VSS   WBC 7.3 from 11.6  NPO  IV fluids  NG to LIWS  Telemetry  Colace twice daily  Reglan 10 mg IV every 8 hours  Protonix  Pain/nausea meds  Ambulate  Binder  I-S  Will discuss with Dr. La    Hypokalemia  3.2-replaced with 40K IV  Continue with maintenance fluids of D5 and a half with 20K at 75  BMP ordered for morning    Hypoalbuminemia  Observe for now while n.p.o.    DVT prophylaxis  SCDs  Ambulate  Will discuss anticoagulant with surgeon    1230 discussed with Dr La-Clamp SOFYA and can have full liquid diet now. Change back to NPO with NG back to LIWS if has n/v. C/w same IVF. Lovenox for dvt prophylaxis.     Lilia Cortez, APRN-CNP

## 2024-09-18 NOTE — NURSING NOTE
Obtained bedside shift report from nightshift RN and patient up in the bathroom during report. Patient stated he was attempting to have a BM. Denied any current pain or questions. Instructed to pull the call cord in the bathroom if he needed assistance. Patient has been up walking in the hallway as well, still strict NPO.

## 2024-09-19 ENCOUNTER — APPOINTMENT (OUTPATIENT)
Dept: RADIOLOGY | Facility: HOSPITAL | Age: 39
End: 2024-09-19
Payer: MEDICARE

## 2024-09-19 LAB
ANION GAP SERPL CALCULATED.3IONS-SCNC: 10 MMOL/L (ref 10–20)
BASOPHILS # BLD AUTO: 0.06 X10*3/UL (ref 0–0.1)
BASOPHILS NFR BLD AUTO: 0.6 %
BUN SERPL-MCNC: 4 MG/DL (ref 6–23)
CALCIUM SERPL-MCNC: 8.3 MG/DL (ref 8.6–10.3)
CHLORIDE SERPL-SCNC: 107 MMOL/L (ref 98–107)
CO2 SERPL-SCNC: 24 MMOL/L (ref 21–32)
CREAT SERPL-MCNC: 0.66 MG/DL (ref 0.5–1.3)
EGFRCR SERPLBLD CKD-EPI 2021: >90 ML/MIN/1.73M*2
EOSINOPHIL # BLD AUTO: 0.34 X10*3/UL (ref 0–0.7)
EOSINOPHIL NFR BLD AUTO: 3.3 %
ERYTHROCYTE [DISTWIDTH] IN BLOOD BY AUTOMATED COUNT: 12.2 % (ref 11.5–14.5)
GLUCOSE SERPL-MCNC: 109 MG/DL (ref 74–99)
HCT VFR BLD AUTO: 36.5 % (ref 41–52)
HGB BLD-MCNC: 12.1 G/DL (ref 13.5–17.5)
IMM GRANULOCYTES # BLD AUTO: 0.11 X10*3/UL (ref 0–0.7)
IMM GRANULOCYTES NFR BLD AUTO: 1.1 % (ref 0–0.9)
LYMPHOCYTES # BLD AUTO: 2.09 X10*3/UL (ref 1.2–4.8)
LYMPHOCYTES NFR BLD AUTO: 20.4 %
MCH RBC QN AUTO: 28.4 PG (ref 26–34)
MCHC RBC AUTO-ENTMCNC: 33.2 G/DL (ref 32–36)
MCV RBC AUTO: 86 FL (ref 80–100)
MONOCYTES # BLD AUTO: 0.91 X10*3/UL (ref 0.1–1)
MONOCYTES NFR BLD AUTO: 8.9 %
NEUTROPHILS # BLD AUTO: 6.74 X10*3/UL (ref 1.2–7.7)
NEUTROPHILS NFR BLD AUTO: 65.7 %
NRBC BLD-RTO: 0 /100 WBCS (ref 0–0)
PLATELET # BLD AUTO: 258 X10*3/UL (ref 150–450)
POTASSIUM SERPL-SCNC: 3.4 MMOL/L (ref 3.5–5.3)
RBC # BLD AUTO: 4.26 X10*6/UL (ref 4.5–5.9)
SODIUM SERPL-SCNC: 138 MMOL/L (ref 136–145)
WBC # BLD AUTO: 10.3 X10*3/UL (ref 4.4–11.3)

## 2024-09-19 PROCEDURE — 85025 COMPLETE CBC W/AUTO DIFF WBC: CPT | Performed by: NURSE PRACTITIONER

## 2024-09-19 PROCEDURE — 74019 RADEX ABDOMEN 2 VIEWS: CPT

## 2024-09-19 PROCEDURE — 2500000001 HC RX 250 WO HCPCS SELF ADMINISTERED DRUGS (ALT 637 FOR MEDICARE OP): Performed by: SURGERY

## 2024-09-19 PROCEDURE — 80048 BASIC METABOLIC PNL TOTAL CA: CPT | Performed by: NURSE PRACTITIONER

## 2024-09-19 PROCEDURE — 2500000004 HC RX 250 GENERAL PHARMACY W/ HCPCS (ALT 636 FOR OP/ED): Performed by: NURSE PRACTITIONER

## 2024-09-19 PROCEDURE — 36415 COLL VENOUS BLD VENIPUNCTURE: CPT | Performed by: NURSE PRACTITIONER

## 2024-09-19 PROCEDURE — 74019 RADEX ABDOMEN 2 VIEWS: CPT | Performed by: RADIOLOGY

## 2024-09-19 PROCEDURE — 99024 POSTOP FOLLOW-UP VISIT: CPT | Performed by: NURSE PRACTITIONER

## 2024-09-19 PROCEDURE — 1200000002 HC GENERAL ROOM WITH TELEMETRY DAILY

## 2024-09-19 PROCEDURE — 2500000004 HC RX 250 GENERAL PHARMACY W/ HCPCS (ALT 636 FOR OP/ED): Performed by: SURGERY

## 2024-09-19 ASSESSMENT — COGNITIVE AND FUNCTIONAL STATUS - GENERAL
DAILY ACTIVITIY SCORE: 24
MOBILITY SCORE: 24

## 2024-09-19 ASSESSMENT — PAIN SCALES - GENERAL: PAINLEVEL_OUTOF10: 0 - NO PAIN

## 2024-09-19 NOTE — NURSING NOTE
Dr constantino in to see pt NG removed as ordered Patricio tolerated well  made aware low fiber diet call light in reach

## 2024-09-19 NOTE — PROGRESS NOTES
Patricio Ayers is a 39 y.o. male on day 8 of admission presenting with Small bowel obstruction (Multi).    Subjective   Passing flatus. No bm today. Feels okay. Remains somewhat bloated. Hungry. No chest pain or sob. Walking in halls frequently. Awaiting abdominal series results.  using his I-S.     Objective     Vital signs in last 24 hours:  Temp:  [36.4 °C (97.5 °F)-37.1 °C (98.7 °F)] 36.9 °C (98.4 °F)  Heart Rate:  [86] 86  Resp:  [17-22] 20  BP: ()/(54-74) 110/70  Heart Rate:  [86]   Temp:  [36.4 °C (97.5 °F)-37.1 °C (98.7 °F)]   Resp:  [17-22]   BP: ()/(54-74)   Weight:  [115 kg (253 lb)-115 kg (254 lb)]   SpO2:  [95 %-98 %]      Intake/Output last 3 Shifts:  I/O last 3 completed shifts:  In: 3520 (30.7 mL/kg) [P.O.:1660; IV Piggyback:1860]  Out: 1050 (9.1 mL/kg) [Emesis/NG output:1050]  Weight: 114.8 kg     Physical Exam  In chair  No acute distress  Not septic appearing  Looks fine and comfortable  Alert and oriented  Heart regular-SV rhythm on the monitor with a rate of 86  Blood pressure not on the monitor  Lungs clear on room air  No edema  Abdomen soft, positive bowel sounds, very mildly distended, nontender, stapled incisions are all fine without any issues-dressing changed, binder on  NG with about 400 of yellowish in the canister    Scheduled medications  docusate sodium, 100 mg, oral, BID  enoxaparin, 40 mg, subcutaneous, q24h  metoclopramide, 10 mg, intravenous, q8h SHIRA  pantoprazole, 40 mg, intravenous, Daily    Continuous medications  potassium tgtbdup-Z8-9.45%NaCl, 75 mL/hr, Last Rate: 75 mL/hr (09/18/24 2202)    PRN medications  PRN medications: acetaminophen, benzocaine-menthol, HYDROmorphone, ondansetron, oxyCODONE, phenoL    Relevant Results  Results from last 7 days   Lab Units 09/19/24  0557 09/18/24  0555 09/17/24  0542   WBC AUTO x10*3/uL 10.3 7.3 11.6*   HEMOGLOBIN g/dL 12.1* 12.7* 14.2   HEMATOCRIT % 36.5* 38.6* 43.4   PLATELETS AUTO x10*3/uL 258 257 318      Results from  last 7 days   Lab Units 09/19/24  0557 09/18/24  0555 09/17/24  0542   SODIUM mmol/L 138 137 135   POTASSIUM mmol/L 3.4* 3.2* 3.7   CHLORIDE mmol/L 107 105 99   CO2 mmol/L 24 26 23*   BUN mg/dL 4* 8 8   CREATININE mg/dL 0.66 0.64 0.60   GLUCOSE mg/dL 109* 125* 181*   CALCIUM mg/dL 8.3* 8.3* 9.6      XR abdomen 1 view    Result Date: 9/17/2024  Interpreted By:  Michelle Coffey, STUDY: XR ABDOMEN 1 VIEW 9/17/2024 2:50 pm   INDICATION: Signs/Symptoms:NGT placement   COMPARISON: None available.   ACCESSION NUMBER(S): [id number]   ORDERING CLINICIAN: JEANNE DE PAZ   TECHNIQUE: Single view abdomen   FINDINGS: Gas-filled dilated loops of small bowel demonstrated as seen on prior imaging with dilatation measuring up to 6.5 cm. NG tube is in the region of the distal esophagus. There is no evidence for free air.   S shaped scoliosis is demonstrated.             1. Small-bowel obstruction of concern with dilatation measuring up to 6.5 cm.   Signed by: Michelle Coffey 9/17/2024 3:12 PM Dictation workstation:   [id number]    XR abdomen 2 views supine and erect or decub    Result Date: 9/17/2024  Interpreted By:  Lamin Root, STUDY: XR ABDOMEN 2 VIEWS SUPINE AND ERECT OR DECUB;  9/17/2024 11:48 am   INDICATION: Signs/Symptoms:SBO.     COMPARISON: None.   ACCESSION NUMBER(S): [id number]   ORDERING CLINICIAN: SKY CORTEZ   TECHNIQUE: Abdomen supine and upright views   FINDINGS: There is dilatation of several small bowel loops in the abdomen especially on the left concerning for small bowel obstruction   Lung bases are clear       Dilatation of small bowel loops concerning for underlying small bowel obstruction versus ileus. Consider CT for complete evaluation   MACRO: None   Signed by: Lamin Root 9/17/2024 12:34 PM Dictation workstation:   [id number]      Assessment/Plan   Generalized abdominal pain due to small bowel obstruction due to recurrent incarcerated ventral hernia with obstruction and without  gangrene  9/19/24: POD 8, Laparoscopic Ventral hernia repair with SBR. NGT to clamp.  Awaiting results of abdominal series. Clamped ngt. DVT prophylaxis. Ambulation encouraged. IS encouraged.     Resolved-status post repair of ventral hernia laparoscopy, lysis of adhesions, small bowel resection 9/11  Pathology:  Segment of small bowel with focal mild nonspecific acute inflammation, vascular congestion, and subserosal fibrosis and hemorrhage    9/17 x-ray abdomen 2 views:  IMPRESSION:  Dilatation of small bowel loops concerning for underlying small bowel  obstruction versus ileus. Consider CT for complete evaluation    9/17 x-ray abdomen 1 view:  IMPRESSION:  1. Small-bowel obstruction of concern with dilatation measuring up to  6.5 cm.    Patient was having trouble tolerating his diet and reported an episode of vomiting after trying his breakfast 9/17 morning.  NG was replaced 9/17 at 1340    VSS   WBC 7.3 from 11.6  NPO  IV fluids  NG to LIWS  Telemetry  Colace twice daily  Reglan 10 mg IV every 8 hours  Protonix  Pain/nausea meds  Ambulate  Binder  I-S  Will discuss with Dr. La    Hypokalemia  3.2-replaced with 40K IV  Continue with maintenance fluids of D5 and a half with 20K at 75  BMP ordered for morning    Hypoalbuminemia  Observe for now while n.p.o.    DVT prophylaxis  SCDs  Ambulate  Will discuss anticoagulant with surgeon    1230 discussed with Dr La-Cristobal COWART and can have full liquid diet now. Change back to NPO with NG back to LIWS if has n/v. C/w same IVF. Lovenox for dvt prophylaxis.     Cici Kraus, APRN-CNP

## 2024-09-19 NOTE — NURSING NOTE
While in medicating patient he is requesting updates about his KUB earlier today. Sent a secure chat to Cici Kraus and was told she needed to discuss with Dr. La. Patient still denying any pain or nausea/vomiting. Still passing gas but no BM yet today. Patient voicing frustrations about not knowing what's going on and being tired of having the NG tube in and just being in the hospital for so long in general. Tried to encourage the patient and told we will update him as soon as we can. Patient agrees and call light in reach.

## 2024-09-19 NOTE — CONSULTS
"Nutrition Assessement Note    Nutrition Assessment    Reason for Assessment: Length of stay    Spoke with pt and mother at bedside. Pt had ventral hernia repair and bowel resection on 9/11. Mother reported that pt has a history of type 2 diabetes. Pt reported normal appetite and stated that he is ready to eat. Will provide mighty shake TID until diet is advanced.     Reason for Hospital Admission:  Patricio Ayers is a 39 y.o. male who is admitted for Small bowel obstruction.     History reviewed. No pertinent past medical history.   Past Surgical History:   Procedure Laterality Date    HERNIA REPAIR         Nutrition History:  Food and Nutrient History: Pt reported normal appetite and wants to eat solid food  Energy Intake: Poor < 50 %     Food Allergies/Intolerances:  None  GI Symptoms: None  Oral Problems: None    Anthropometrics:  Ht: 188 cm (6' 2\"), Wt: 115 kg (253 lb), BMI: 32.47  IBW/kg (Dietitian Calculated): 86.4 kg  Percent of IBW: 133 %       Weight Change:  Daily Weight  09/19/24 : 115 kg (253 lb)  02/24/23 : 113 kg (250 lb)     Weight History / % Weight Change: Pt reported UBW of 255#             Nutrition Focused Physical Exam Findings:                       Nutrition Significant Labs:  Lab Results   Component Value Date    WBC 10.3 09/19/2024    HGB 12.1 (L) 09/19/2024    HCT 36.5 (L) 09/19/2024     09/19/2024    ALT 38 09/18/2024    AST 31 09/18/2024     09/19/2024    K 3.4 (L) 09/19/2024     09/19/2024    CREATININE 0.66 09/19/2024    BUN 4 (L) 09/19/2024    CO2 24 09/19/2024    TSH 1.86 11/14/2022    HGBA1C 6.7 (H) 11/14/2022    ALBUR 597 (H) 11/14/2022     Nutrition Specific Medications:  docusate sodium, 100 mg, oral, BID  enoxaparin, 40 mg, subcutaneous, q24h  metoclopramide, 10 mg, intravenous, q8h SHIRA  pantoprazole, 40 mg, intravenous, Daily      potassium cqpwkzh-F5-6.45%NaCl, 75 mL/hr, Last Rate: 75 mL/hr (09/19/24 1341)        Dietary Orders (From admission, onward)       " Start     Ordered    09/18/24 1237  Adult diet Full Liquid  Diet effective now        Question:  Diet type  Answer:  Full Liquid    09/18/24 1238    09/18/24 1237  May Participate in Room Service  Once        Question:  .  Answer:  Yes    09/18/24 1238                    Estimated Needs:   Estimated Energy Needs  Total Energy Estimated Needs (kCal): 2160 kCal  Total Estimated Energy Need per Day (kCal/kg): 25 kCal/kg  Method for Estimating Needs: IBW    Estimated Protein Needs  Total Protein Estimated Needs (g): 86 g  Total Protein Estimated Needs (g/kg): 1 g/kg  Method for Estimating Needs: IBW    Estimated Fluid Needs  Total Fluid Estimated Needs (mL): 2160 mL  Method for Estimating Needs: 1 mL/kcal        Nutrition Diagnosis   Nutrition Diagnosis:       Nutrition Diagnosis  Patient has Nutrition Diagnosis: Yes  Diagnosis Status (1): New  Nutrition Diagnosis 1: Inadequate energy intake  Related to (1): decreased ability to consume suficient energy  As Evidenced by (1): poor PO intake       Nutrition Interventions/Recommendations   Nutrition Interventions and Recommendations:    Nutrition Prescription:  Individualized Nutrition Prescription Provided for : 2160 kcals, 86 g protein via diet    Nutrition Interventions:   Food and/or Nutrient Delivery Interventions  Interventions: Meals and snacks, Medical food supplement  Meals and Snacks: Carbohydrate-modified diet  Goal: recommend a 75 g carb-control diet as able  Medical Food Supplement: Commercial beverage  Goal: mighty shake TID to provide 200 kcals and 7g protein each    Education Documentation  No documentation found.           Nutrition Monitoring and Evaluation   Monitoring/Evaluation:   Food/Nutrient Related History Monitoring  Monitoring and Evaluation Plan: Energy intake  Energy Intake: Estimated energy intake  Criteria: pt to consume >/= 75% estimated needs         Time Spent/Follow-up:   Follow Up  Time Spent (min): 30 minutes  Last Date of Nutrition  Visit: 09/19/24  Nutrition Follow-Up Needed?: 5-7 days  Follow up Comment: 9/26/24

## 2024-09-19 NOTE — PROGRESS NOTES
"Patricio Ayers is a 39 y.o. male on day 8 of admission presenting with Small bowel obstruction (Multi).    Subjective   Having some bowel function, BM and flatus.  Minimal NG residual output.  Tolerating FLD.         Objective     Physical Exam  Constitutional:       Appearance: Normal appearance.   Pulmonary:      Effort: Pulmonary effort is normal.   Abdominal:      Palpations: Abdomen is soft.      Comments: Mild distention    Skin:     General: Skin is warm.   Neurological:      General: No focal deficit present.      Mental Status: He is alert.   Psychiatric:         Mood and Affect: Mood normal.         Last Recorded Vitals  Blood pressure 111/71, pulse 86, temperature 36.3 °C (97.3 °F), temperature source Temporal, resp. rate 18, height 1.88 m (6' 2\"), weight 115 kg (253 lb), SpO2 97%.  Intake/Output last 3 Shifts:  I/O last 3 completed shifts:  In: 3520 (30.7 mL/kg) [P.O.:1660; IV Piggyback:1860]  Out: 1050 (9.1 mL/kg) [Emesis/NG output:1050]  Weight: 114.8 kg     Relevant Results               Lab Results   Component Value Date    WBC 10.3 09/19/2024    HGB 12.1 (L) 09/19/2024    HCT 36.5 (L) 09/19/2024    MCV 86 09/19/2024     09/19/2024     Lab Results   Component Value Date    GLUCOSE 109 (H) 09/19/2024    CALCIUM 8.3 (L) 09/19/2024     09/19/2024    K 3.4 (L) 09/19/2024    CO2 24 09/19/2024     09/19/2024    BUN 4 (L) 09/19/2024    CREATININE 0.66 09/19/2024     === 09/09/24 ===    XR ABDOMEN 2 VIEWS SUPINE AND ERECT OR DECUB    - Impression -  1. Interval decrease in small bowel dilatation with scattered fluid  levels demonstrated with mild gas-filled loops of large bowel with  improving small-bowel obstruction    Signed by: Michelle Coffey 9/19/2024 12:02 PM  Dictation workstation:   [id number]                 Assessment/Plan   Assessment & Plan  Ventral hernia with obstruction and without gangrene    38 yo man who is one week post op form open ventral hernia repair and small bowel " resection.  Has been dealing with post operative ileus.  Now passing flatus and having bowel movements.  NG removed.    - Soft diet  - Continue stool softeners   - Ambulate   - Lovenox for DVT ppx   - Dispo once having bowel function and tolerating PO       I spent 30 minutes in the professional and overall care of this patient.      Gabino La MD

## 2024-09-19 NOTE — NURSING NOTE
Received bedside shift report from nightshift RN with patient sitting up in chair during report. Dr. La already rounded and seen patient requested hook up to suction for 30 min, monitor output and then get KUB to assess for any obstruction. Patient agreeable to this and instructed not to eat or drink till study is complete so we have accurate output. Patient passing gas this morning but no BM yet today. Currently no output in NG canister flushed with 20cc of water to ensure patency and 5cc returned with saliva-like consistency. Message sent to Dr. La so he is aware. Patient denied any current pain or nausea and has no questions at this time. Call light is in reach

## 2024-09-19 NOTE — NURSING NOTE
"Patricio return from [facility] via cart ambulated to bed  telemetry and IV restarted call light in reach  asking \"when will this tube come out\"  awaiting results and order from Dr Ritter  "

## 2024-09-20 VITALS
RESPIRATION RATE: 16 BRPM | TEMPERATURE: 97.7 F | SYSTOLIC BLOOD PRESSURE: 100 MMHG | OXYGEN SATURATION: 100 % | DIASTOLIC BLOOD PRESSURE: 59 MMHG | HEART RATE: 83 BPM | WEIGHT: 253 LBS | BODY MASS INDEX: 32.47 KG/M2 | HEIGHT: 74 IN

## 2024-09-20 PROCEDURE — 2500000004 HC RX 250 GENERAL PHARMACY W/ HCPCS (ALT 636 FOR OP/ED): Performed by: SURGERY

## 2024-09-20 PROCEDURE — 2500000001 HC RX 250 WO HCPCS SELF ADMINISTERED DRUGS (ALT 637 FOR MEDICARE OP): Performed by: SURGERY

## 2024-09-20 ASSESSMENT — COGNITIVE AND FUNCTIONAL STATUS - GENERAL
MOBILITY SCORE: 22
CLIMB 3 TO 5 STEPS WITH RAILING: A LOT
MOBILITY SCORE: 22
CLIMB 3 TO 5 STEPS WITH RAILING: A LOT
DAILY ACTIVITIY SCORE: 24
DAILY ACTIVITIY SCORE: 24

## 2024-09-20 ASSESSMENT — PAIN - FUNCTIONAL ASSESSMENT: PAIN_FUNCTIONAL_ASSESSMENT: 0-10

## 2024-09-20 ASSESSMENT — PAIN SCALES - WONG BAKER: WONGBAKER_NUMERICALRESPONSE: NO HURT

## 2024-09-20 ASSESSMENT — PAIN SCALES - GENERAL
PAINLEVEL_OUTOF10: 0 - NO PAIN
PAINLEVEL_OUTOF10: 0 - NO PAIN

## 2024-09-20 NOTE — NURSING NOTE
Pt A&O x3 currently sitting in bedside chair. No complaints or concerns. Call light within reach. Pt continues on IV fluids per order.

## 2024-09-20 NOTE — PROGRESS NOTES
Patricio Ayers is a 39 y.o. male on day 9 of admission presenting with Small bowel obstruction (Multi).    Subjective   Passing flatus. BM this AM. Feels okay. Remains somewhat bloated. Hungry. No chest pain or sob. Walking in halls frequently. Looking forward to discharge today. Denies nausea or vomiting.      Objective     Vital signs in last 24 hours:  Temp:  [36.5 °C (97.7 °F)-36.9 °C (98.4 °F)] 36.5 °C (97.7 °F)  Heart Rate:  [71-92] 83  Resp:  [16-17] 16  BP: (100-118)/(59-77) 100/59  Heart Rate:  [71-92]   Temp:  [36.5 °C (97.7 °F)-36.9 °C (98.4 °F)]   Resp:  [16-17]   BP: (100-118)/(59-77)   SpO2:  [94 %-100 %]      Intake/Output last 3 Shifts:  I/O last 3 completed shifts:  In: 3535 (30.8 mL/kg) [P.O.:2690; NG/GT:20; IV Piggyback:825]  Out: 555 (4.8 mL/kg) [Emesis/NG output:555]  Weight: 114.8 kg     Physical Exam  In chair  No acute distress  Not septic appearing  Looks fine and comfortable  Alert and oriented  Heart regular-SV rhythm on the monitor with a rate of 86  Blood pressure not on the monitor  Lungs clear on room air  No edema  Abdomen soft, positive bowel sounds, very mildly distended, nontender, stapled incisions are all fine without any issues-dressing changed, binder on  NG with about 400 of yellowish in the canister    Scheduled medications  docusate sodium, 100 mg, oral, BID  enoxaparin, 40 mg, subcutaneous, q24h  metoclopramide, 10 mg, intravenous, q8h SHIRA  pantoprazole, 40 mg, intravenous, Daily    Continuous medications  potassium rliaool-A1-4.45%NaCl, 75 mL/hr, Last Rate: 75 mL/hr (09/20/24 0647)    PRN medications  PRN medications: acetaminophen, benzocaine-menthol, HYDROmorphone, ondansetron, oxyCODONE, phenoL    Relevant Results  Results from last 7 days   Lab Units 09/19/24  0557 09/18/24  0555 09/17/24  0542   WBC AUTO x10*3/uL 10.3 7.3 11.6*   HEMOGLOBIN g/dL 12.1* 12.7* 14.2   HEMATOCRIT % 36.5* 38.6* 43.4   PLATELETS AUTO x10*3/uL 258 257 318      Results from last 7 days   Lab  Units 09/19/24  0557 09/18/24  0555 09/17/24  0542   SODIUM mmol/L 138 137 135   POTASSIUM mmol/L 3.4* 3.2* 3.7   CHLORIDE mmol/L 107 105 99   CO2 mmol/L 24 26 23*   BUN mg/dL 4* 8 8   CREATININE mg/dL 0.66 0.64 0.60   GLUCOSE mg/dL 109* 125* 181*   CALCIUM mg/dL 8.3* 8.3* 9.6      XR abdomen 2 views supine and erect or decub    Result Date: 9/19/2024  Interpreted By:  Michelle Coffey, STUDY: XR ABDOMEN 2 VIEWS SUPINE AND ERECT OR DECUB 9/19/2024 9:21 am   INDICATION: Signs/Symptoms:SBO   COMPARISON: 09/17/2024   ACCESSION NUMBER(S): [id number]   ORDERING CLINICIAN: SKY CORTEZ   TECHNIQUE: Two views abdomen   FINDINGS: Gas-filled and fluid-filled distended loops of small bowel identified measuring up to 3.0 cm with interval decrease in small bowel dilatation from the prior examination with scattered air-fluid levels demonstrated. There is mild gas-filled loops of large bowel. Findings suggest ileus type pattern given multiple surgical staples in the midline lower abdomen. No evidence for free air. NG tube is demonstrated within the distal esophagus. Consider distal repositioning.   There is a S shaped scoliosis.       1. Interval decrease in small bowel dilatation with scattered fluid levels demonstrated with mild gas-filled loops of large bowel with improving small-bowel obstruction   Signed by: Michelle Coffey 9/19/2024 12:02 PM Dictation workstation:   [id number]      Assessment/Plan   Generalized abdominal pain due to small bowel obstruction due to recurrent incarcerated ventral hernia with obstruction and without gangrene    9/20/24: Patient doing well today. Tolerating diet and having bowel function. Discharge in.      9/19/24: POD 8, Laparoscopic Ventral hernia repair with SBR. NGT to clamp.  Awaiting results of abdominal series. Clamped ngt. DVT prophylaxis. Ambulation encouraged. IS encouraged.     Resolved-status post repair of ventral hernia laparoscopy, lysis of adhesions, small bowel resection  9/11  Pathology:  Segment of small bowel with focal mild nonspecific acute inflammation, vascular congestion, and subserosal fibrosis and hemorrhage    9/17 x-ray abdomen 2 views:  IMPRESSION:  Dilatation of small bowel loops concerning for underlying small bowel  obstruction versus ileus. Consider CT for complete evaluation    9/17 x-ray abdomen 1 view:  IMPRESSION:  1. Small-bowel obstruction of concern with dilatation measuring up to  6.5 cm.    Patient was having trouble tolerating his diet and reported an episode of vomiting after trying his breakfast 9/17 morning.  NG was replaced 9/17 at 1340    VSS   WBC 7.3 from 11.6  NPO  IV fluids  NG to LIWS  Telemetry  Colace twice daily  Reglan 10 mg IV every 8 hours  Protonix  Pain/nausea meds  Ambulate  Binder  I-S  Will discuss with Dr. La    Hypokalemia  3.2-replaced with 40K IV  Continue with maintenance fluids of D5 and a half with 20K at 75  BMP ordered for morning    Hypoalbuminemia  Observe for now while n.p.o.    DVT prophylaxis  SCDs  Ambulate  Will discuss anticoagulant with surgeon    1230 discussed with Dr La-Clamp NG and can have full liquid diet now. Change back to NPO with NG back to LIWS if has n/v. C/w same IVF. Lovenox for dvt prophylaxis.     Kianna Ang PA-C

## 2024-09-20 NOTE — CARE PLAN
The clinical goals for the shift include Advance diet    Over the shift, the patient did  make progress toward the following goals.       Problem: Pain - Adult  Goal: Verbalizes/displays adequate comfort level or baseline comfort level  Outcome: Progressing      Problem: Nutrition  Goal: Oral intake greater 75%  Outcome: Progressing     
The patient's goals for the shift include Decompress abdomen for surgery    The clinical goals for the shift include Advance diet    Over the shift, the patient did not make progress toward the following goals. Barriers to progression include Waiting for KUB to be resulted. Recommendations to address these barriers include Continue frequent ambulation while waiting for results as well as continuing to monitor for any nausea or vomiting.    
The patient's goals for the shift include Decompress abdomen for surgery    The clinical goals for the shift include advance diet    Over the shift, the patient did not make progress toward the following goals. Barriers to progression include slow motility. Recommendations to address these barriers include increase activity .    
The patient's goals for the shift include Decompress abdomen for surgery    The clinical goals for the shift include comfort    Over the shift, the patient did not make progress toward the following goals. Barriers to progression include n/a. Recommendations to address these barriers include n/a.    
The patient's goals for the shift include Decompress abdomen for surgery    The clinical goals for the shift include control pain    Over the shift, the patient did not make progress toward the following goals. Barriers to progression include N&V. Recommendations to address these barriers include medicate as needed.    
The patient's goals for the shift include Decompress abdomen for surgery    The clinical goals for the shift include manage N&V and pain      
The patient's goals for the shift include Decompress abdomen for surgery    The clinical goals for the shift include manage N&V and pain    Over the shift, the patient did not make progress toward the following goals. Barriers to progression include pain. Recommendations to address these barriers include manage pain.    
The patient's goals for the shift include Decompress abdomen for surgery    The clinical goals for the shift include manage pain        
The patient's goals for the shift include Decompress abdomen for surgery    The clinical goals for the shift include manage pain    Over the shift, the patient did not make progress toward the following goals. Barriers to progression include pain and N&V. Recommendations to address these barriers include manage pian and N&V.    
The patient's goals for the shift include Decompress abdomen for surgery    The clinical goals for the shift include manage pain    Over the shift, the patient did not make progress toward the following goals. Barriers to progression include pain. Recommendations to address these barriers include manage pain.    
The patient's goals for the shift include Decompress abdomen for surgery    The clinical goals for the shift include manage pain and N&V    Over the shift, the patient did not make progress toward the following goals. Barriers to progression include pain . Recommendations to address these barriers include manage pain.    
The patient's goals for the shift include Decompress abdomen for surgery    The clinical goals for the shift include mange pain and N&V    Over the shift, the patient did not make progress toward the following goals. Barriers to progression include pain and N&V. Recommendations to address these barriers include manage pain and N&V  
The patient's goals for the shift include Decompress abdomen for surgery    The clinical goals for the shift include monitor N&V    Over the shift, the patient did not make progress toward the following goals. Barriers to progression include N&V. Recommendations to address these barriers include manage N&V.    
The patient's goals for the shift include Decompress abdomen for surgery    The clinical goals for the shift include no N&V        
The patient's goals for the shift include Decompress abdomen for surgery    The clinical goals for the shift include no N&V    Over the shift, the patient did not make progress toward the following goals. Barriers to progression include nausea. Recommendations to address these barriers include monitor for N&V.    
The patient's goals for the shift include Decompress abdomen for surgery    The clinical goals for the shift include remove NG after no nausea and vomiting the shift    Over the shift, the patient make progress toward the following goals. Barriers to progression include nausea and vomiting . Recommendations to address these barriers     
The patient's goals for the shift include comfort and dc planning    The clinical goals for the shift include Maintain patient safety        
The patient's goals for the shift include toletate food and dc planning    The clinical goals for the shift include no N&V        
intact

## 2024-09-24 ENCOUNTER — OFFICE VISIT (OUTPATIENT)
Dept: SURGERY | Facility: CLINIC | Age: 39
End: 2024-09-24
Payer: MEDICARE

## 2024-09-24 DIAGNOSIS — K59.01 SLOW TRANSIT CONSTIPATION: Primary | ICD-10-CM

## 2024-09-24 PROCEDURE — 99024 POSTOP FOLLOW-UP VISIT: CPT | Performed by: SURGERY

## 2024-09-24 RX ORDER — DOCUSATE SODIUM 100 MG/1
100 CAPSULE, LIQUID FILLED ORAL 2 TIMES DAILY
Qty: 14 CAPSULE | Refills: 0 | Status: SHIPPED | OUTPATIENT
Start: 2024-09-24 | End: 2024-10-08

## 2024-09-24 ASSESSMENT — ENCOUNTER SYMPTOMS
ABDOMINAL PAIN: 0
DIAPHORESIS: 0
APPETITE CHANGE: 0
ABDOMINAL DISTENTION: 0
CONSTIPATION: 0
ACTIVITY CHANGE: 0
CONFUSION: 0
SHORTNESS OF BREATH: 0
FEVER: 0
DIZZINESS: 0
AGITATION: 0
CHILLS: 0

## 2024-09-24 NOTE — PROGRESS NOTES
Subjective   Patient ID: Patricio Ayers is a 39 y.o. male who presents for No chief complaint on file..    The patient presents for 2-week postop visit from open ventral hernia repair and small bowel resection for incarcerated ventral hernia.  He has been healing well since the surgery.  He is back home now.  Eating normally, and having normal bowel function.  He denies any abdominal pain.  He also denies any nausea or vomiting.    Review of Systems   Constitutional:  Negative for activity change, appetite change, chills, diaphoresis and fever.   Respiratory:  Negative for shortness of breath.    Cardiovascular:  Negative for chest pain.   Gastrointestinal:  Negative for abdominal distention, abdominal pain and constipation.   Neurological:  Negative for dizziness.   Psychiatric/Behavioral:  Negative for agitation and confusion.    All other systems reviewed and are negative.      Objective   Gen: Alert and Oriented, no distress  Pulm: Breathing Comfortably on room air   CV: RRR, non-tachycardic   Abd: Soft, appropriately tender to palpation.  Incisions well approximated      Assessment/Plan   The patient is a 39-year-old man who is 2 weeks postop from an open ventral hernia repair with small bowel resection.  He has been healing well since the surgery.  No major issues.  I advised him to be careful about eating high-fiber foods, as these could cause intestinal blockages.  We will prescribe him some Colace to help maintain bowel function.  He can follow-up with us as needed.         Gabino La MD 09/24/24 10:14 AM

## 2025-03-10 ENCOUNTER — APPOINTMENT (OUTPATIENT)
Dept: SURGERY | Facility: CLINIC | Age: 40
End: 2025-03-10
Payer: MEDICARE

## 2025-03-10 VITALS — BODY MASS INDEX: 32.08 KG/M2 | HEIGHT: 74 IN | WEIGHT: 250 LBS

## 2025-03-10 DIAGNOSIS — K43.2 INCISIONAL HERNIA, WITHOUT OBSTRUCTION OR GANGRENE: ICD-10-CM

## 2025-03-10 DIAGNOSIS — K43.6 VENTRAL HERNIA WITH OBSTRUCTION AND WITHOUT GANGRENE: ICD-10-CM

## 2025-03-10 PROCEDURE — 99213 OFFICE O/P EST LOW 20 MIN: CPT | Performed by: SURGERY

## 2025-03-10 PROCEDURE — 3008F BODY MASS INDEX DOCD: CPT | Performed by: SURGERY

## 2025-03-10 PROCEDURE — 1036F TOBACCO NON-USER: CPT | Performed by: SURGERY

## 2025-03-10 ASSESSMENT — ENCOUNTER SYMPTOMS
CHILLS: 0
HEMATURIA: 0
COUGH: 0
VOMITING: 0
DYSURIA: 0
APPETITE CHANGE: 0
VOMITING: 0
AGITATION: 0
APPETITE CHANGE: 0
FEVER: 0
LIGHT-HEADEDNESS: 0
DIZZINESS: 0
DIZZINESS: 0
DYSURIA: 0
RECTAL PAIN: 0
WEAKNESS: 0
FEVER: 0
CONSTIPATION: 0
ANAL BLEEDING: 0
ABDOMINAL PAIN: 0
CHILLS: 0
SHORTNESS OF BREATH: 0
SHORTNESS OF BREATH: 0
ACTIVITY CHANGE: 0
LIGHT-HEADEDNESS: 0
CHEST TIGHTNESS: 0
UNEXPECTED WEIGHT CHANGE: 0
FEVER: 0
DIAPHORESIS: 0
RECTAL PAIN: 0
FATIGUE: 0
DIARRHEA: 0
BLOOD IN STOOL: 0
BLOOD IN STOOL: 0
CONSTIPATION: 0
SHORTNESS OF BREATH: 0
PALPITATIONS: 0
DIFFICULTY URINATING: 0
PALPITATIONS: 0
DIZZINESS: 0
COUGH: 0
DIAPHORESIS: 0
DIARRHEA: 0
ANAL BLEEDING: 0
UNEXPECTED WEIGHT CHANGE: 0
CONFUSION: 0
CHILLS: 0
FATIGUE: 0
NAUSEA: 0
ACTIVITY CHANGE: 0
DIFFICULTY URINATING: 0
HEMATURIA: 0
ACTIVITY CHANGE: 0
APPETITE CHANGE: 0
WEAKNESS: 0
NAUSEA: 0
DIAPHORESIS: 0
CHEST TIGHTNESS: 0
ABDOMINAL PAIN: 0

## 2025-03-10 NOTE — PROGRESS NOTES
"Patricio Ayers  [id number]   03/10/25  12:40 PM    HPI/Subjective:  Patricio Ayers is a 39 y.o. male with history of incarcerated ventral hernia s/p Open primary hernia repair of incarcerated anterior abdominal wall hernia (2023), recurrent incarcerated ventral hernia s/p  laparoscopic ventral hernia repair, [facility], small bowel resection (2024) who is referred to clinic by Gabino La MD for evaluation of a/an recurrent ventral incisional hernia(s).    They note a bulge*** which {DOES/ DOES NOT:77767} seem to be increasing in size over time.    Symptomatically, this patient experiences pain {frequency:77850}, which is {COURSE IMPROVING/WORSENING SX HPI:07252}. ***    They have had {SEVERAL/MANY/NO:31348::\"no\"} episode(s) of incarceration***.    They have had {SEVERAL/MANY/NO:38319::\"no\"} episode(s) of obstructive symptoms attributed to the hernia***.    They have had  two prior repair(s). Review of operative notes indicates that the most recent repair in 2024 was completed at the time of a small bowel resection and therefore was done without mesh and the initial repair in 2023 was also completed without mesh .    Past surgical history is otherwise notable for laparoscopic ventral hernia repair, [facility], small bowel resection (9/11/2024), Open primary hernia repair of incarcerated anterior abdominal wall hernia (2/11/2023)    From a pain history standpoint,  Behavioral health history - None  Opioid substance use - None    From a functional/activity standpoint,  Ability to perform ADLs in 30 days prior to surgery - {sjzfunctionalstatus:64159::\"Independent\"}  Employment - {sjzemployment:61477}  Sporting Activity - {sjzsportingactivity:41507}  BMI - There is no height or weight on file to calculate BMI.    Past medical history is significant for:    Hepatic insufficiency or liver failure - No  Ascites - No  Hypertension - No  Diabetes mellitus - No   Dialysis (acute or chronic renal failure requiring dialysis within 2 weeks " prior to surgery) - None  COPD - No  Dyspnea - No  Antiplatelet medications excluding aspirin - No  Anticoagulation medications - No  Aspirin - No  Immunosuppression - No  Nicotine use in relation to OR date - No  History of AAA - No  Collagen vascular disorder - No  Congestive heart failure - No  Active pregnancy - No    Review of Systems   Constitutional:  Negative for activity change, appetite change, chills, diaphoresis, fatigue, fever and unexpected weight change.   Respiratory:  Negative for cough, chest tightness and shortness of breath.    Cardiovascular:  Negative for palpitations and leg swelling.   Gastrointestinal:  Negative for abdominal pain, anal bleeding, blood in stool, constipation, diarrhea, nausea, rectal pain and vomiting.   Genitourinary:  Negative for difficulty urinating, dysuria and hematuria.   Neurological:  Negative for dizziness, weakness and light-headedness.          Objective:  There is no height or weight on file to calculate BMI.  Physical Exam  Vitals reviewed. Exam conducted with a chaperone present.   Constitutional:       General: He is not in acute distress.     Appearance: Normal appearance. He is not ill-appearing.   HENT:      Head: Normocephalic.      Mouth/Throat:      Mouth: Mucous membranes are moist.   Eyes:      Extraocular Movements: Extraocular movements intact.      Pupils: Pupils are equal, round, and reactive to light.   Cardiovascular:      Rate and Rhythm: Normal rate and regular rhythm.      Pulses: Normal pulses.      Heart sounds: Normal heart sounds. No murmur heard.  Pulmonary:      Effort: Pulmonary effort is normal. No respiratory distress.      Breath sounds: Normal breath sounds. No wheezing, rhonchi or rales.   Chest:      Chest wall: No tenderness.   Abdominal:      General: There is no distension.      Palpations: There is no mass.      Tenderness: There is no abdominal tenderness. There is no guarding or rebound.      Hernia: A hernia is present.  "  Musculoskeletal:         General: No swelling or deformity.      Cervical back: Neck supple. No rigidity.      Right lower leg: No edema.      Left lower leg: No edema.   Skin:     General: Skin is warm.      Coloration: Skin is not jaundiced or pale.   Neurological:      General: No focal deficit present.      Mental Status: He is alert and oriented to person, place, and time. Mental status is at baseline.   Psychiatric:         Mood and Affect: Mood normal.         Behavior: Behavior normal.         Thought Content: Thought content normal.         Judgment: Judgment normal.         Imaging consisting    was reviewed personally and was notable for ***.    Assessment/Plan:  Patricio Ayers is a 39 y.o. male with history of ncarcerated ventral hernia s/p Open primary hernia repair of incarcerated anterior abdominal wall hernia (2023), recurrent incarcerated ventral hernia s/p  laparoscopic ventral hernia repair, [facility], small bowel resection (2024) who presents with a/an {Desc; symptomatic/asymptomatic:77067} recurrent ventral incisional hernia(s), having undergone two prior repairs.    We will plan to [facility]}.    I will see the patient back in the office in {sjzreturnperiod:79237} for *** or sooner if needed.    The patient {DID/DID NOT:23674::\"did\"} provide consent for surgery and participation in the [facility] at this clinic visit.    Portions of medical record reviewed for pertinent issues including active problem list, medication list, allergies, social history, health maintenance, notes from previous encounters, lab results, and imaging.     Palomo Ureña MD  Assistant Professor of Surgery  Division of General Surgery  Department of Surgery      9/11/2024 Procedure  Repair Ventral Hernia Laparoscopy, [facility], Small Bowel Resection   Indications: Patricio Ayers is an 39 y.o. male who is having surgery for Generalized abdominal pain [R10.84]  Ventral hernia with obstruction and without gangrene [K43.6].  The " patient presented to the emergency department with 1 day of worsening epigastric abdominal pain associate with nausea and emesis.  He had a history of prior incarcerated ventral hernia that was repaired emergently 1 year ago.  He underwent CT scan, which showed a recurrent ventral hernia and associated small bowel obstruction.  I explained to him the risks and benefits of going to the operating room for a laparoscopic ventral hernia repair, lysis of adhesions, and possible small bowel resection.  These risk include risk of bleeding, infection, or injury to surround structures.  The patient agreed to proceed with the operation.     The patient was seen in the preoperative area. The risks, benefits, complications, treatment options, non-operative alternatives, expected recovery and outcomes were discussed with the patient. The possibilities of reaction to medication, pulmonary aspiration, injury to surrounding structures, bleeding, recurrent infection, the need for additional procedures, failure to diagnose a condition, and creating a complication requiring transfusion or operation were discussed with the patient. The patient concurred with the proposed plan, giving informed consent.  The site of surgery was properly noted/marked if necessary per policy. The patient has been actively warmed in preoperative area. Preoperative antibiotics have been ordered and given within 1 hours of incision. Venous thrombosis prophylaxis have been ordered including bilateral sequential compression devices     Procedure Details:   The patient was brought to the operating room, placed supine on the operating table, and general endotracheal esthesia was induced.  His abdomen was then prepped and draped sterilely.  We began by making an incision in the left upper quadrant at Russo's point, and used an optical view trocar to get access to the abdomen.  The abdomen was then insufflated to 15 mmHg.  On inspection of the abdomen he was noted  to have multiple adhesions of his small bowel as well as omentum to his anterior abdominal wall along with a recurrent ventral hernia containing a portion of the small bowel.  We performed some laparoscopic lysis of adhesions, taking down a majority of the omental adhesions as well as some of the small bowel adhesions, but there was a portion of the small bowel that was densely adherent to the anterior abdominal wall and the peritoneal hernia sac.  We therefore made an incision over his prior hernia repair, and dissected out the hernia sac.  Through meticulous dissection we are able to take down the adhesions.  We then eviscerated the small bowel.  He was noted to have an area distal to his incarcerated bowel which had multiple interloop adhesions and resulting accordioning of the small bowel on itself.  Lysis of these adhesions resulted in multiple serosal tears.  Given that he would likely reform these adhesions, we elected to resect this portion of the bowel.  The entire section of bowel measured 30 cm in length.  We used blue loads on a 75 mm TICO stapler to transect the bowel proximally and medially.  We then used a LigaSure device in order to take the mesentery.  We performed a stapled side-to-side, functional end-to-end anastomosis using another 75 mm blue load on the TICO stapler.  We closed the common enterotomy with another blue load.  We oversewed the common enterotomy using 2-0 Vicryl sutures.  We then closed the mesenteric defect using a 2-0 silk suture.  We then placed the bowel back into the abdomen.  We closed the recurrent ventral hernia using 0 PDS suture in running fashion.  We then closed the skin incisions using staples.  At the end of the procedure all needle, sponge, and instrument counts were correct.  The patient tolerated the procedure well and was brought to the postanesthesia care unit for recovery.      2/11/2023 Procedure  Open primary hernia repair of incarcerated anterior abdominal wall  hernia    Description of Procedure Informed consent was obtained from the patient. The patient was brought back into the operating room placed on the operating table in the supine position. General anesthesia was induced. Time- out SCDs and antibiotics were given according to protocol. His abdomen was prepped and draped according to standard sterile fashion. Local anesthesia was instilled in the midline above the hernia and more superiorly. A knife was used to make an incision through the skin above the hernia and slightly more proximally. Bovie electrocautery was used to take down this incision to the anterior rectus sheath superiorly above the hernia. The abdomen was entered sharply and the linea alba was opened inferiorly to the level of the hernia. Once the hernia was entered there was release of hemorrhagic ascites. There was a hematoma of the small bowel but there did not appear to be xander necrosis. The distal portion of the omentum was also within the hernia sac and it appeared chronically incarcerated and was atrophic and the narrow with stenotic areas in between normal appearing omentum. Because it appeared narrow and stenotic, and could potentially be a lead point for causing an internal hernia in the future, decision was made to resect the abnormal distal part. It was suture ligated transected and sent off as a specimen. The urachal cyst that was seen on the preoperative CT was identified within the hernia sac. This was carefully dissected away so that the anterior rectus sheath bilaterally could be closed. Because he had no infection or symptoms from this tract preoperatively, decision was made to leave it in situ and not proceed with resection. The small bowel was exteriorized and ran from the ligament of Treitz to the cecum. 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 These were not easily   and because he had had any obstructive symptoms prior, they were left alone. The bowel distal to the obstruction had few were areas of congenitally he has an and the bowel leading up to the terminal ileum appeared normal. The incarcerated small bowel was again inspected and the serosal hematoma was stable and there was no evidence of necrosis. The hematoma was gently scraped off to ensure that there was no full- thickness injury, which there was not. And the bowel was placed underneath the remaining omentum. The abdomen was then closed starting inferior and superiorly with a 1. PDS running towards the middle. The skin was closed with Monocryl and Steri- Strips. He tolerated the procedure well and was transferred to PACU in stable condition.

## 2025-03-10 NOTE — PATIENT INSTRUCTIONS
"Patient Education     Abdominal wall hernias   The Basics   Written by the doctors and editors at [facility]   What is an abdominal wall hernia? -- The internal organs and tissues in the belly are held in place by a tough outer wall of tissue called the \"abdominal wall.\" An abdominal hernia is an area in that wall that is weak or torn. Often, when there is a hernia, organs or tissues that are normally held in place by the abdominal wall bulge or stick out through the weak or torn spot.  The size of the bulge can change depending on how much pressure is put on the abdominal wall. For example, straining when coughing or having a bowel movement can make a hernia look bigger. Lying down overnight can make the hernia look smaller, or even disappear, in the morning.  There are many different kinds of abdominal wall hernias (figure 1).  What are the symptoms of abdominal wall hernias? -- Abdominal wall hernias do not always cause symptoms. When they do, they can cause some or all of these symptoms:  A bulge somewhere on the trunk of the body - This bulge can be so small that you don't even realize it's there.  Pain, especially when coughing, straining, or using nearby muscles  A pulling sensation around the bulge  Nausea or vomiting if part of the intestine is blocked in the hernia  Abdominal wall hernias can balloon out and form a sac. That sac can hold a loop of intestine or a piece of fat that should normally be tucked inside of the belly. This can be painful and even dangerous if the tissue in the hernia gets trapped and unable to slide back into the belly. When this happens, the tissue does not get enough blood, so it can become swollen or even die (figure 2).  Should I see a doctor or nurse? -- Yes. See a doctor or nurse if you have any of the symptoms of a hernia. In most cases, doctors can diagnose a hernia just by doing an exam. During the exam, the doctor might ask you to cough or bear down while pressing on your " "hernia. This might be uncomfortable, but it is necessary to find the source of the problem.  Most of the time, the contents of the hernia can be \"reduced,\" or gently pushed back into the belly. But sometimes, the hernia gets trapped and won't go back in. If that happens, the tissue that is trapped can get damaged.  If you develop severe pain around a hernia bulge or feel sick, call your doctor or surgeon right away.  How are hernias treated? -- Not all hernias need treatment right away. But many do need to be repaired with surgery.  Surgeons can repair most hernias in 1 of 2 ways. The right surgery for you depends on the size of your hernia, where on the abdominal wall it is, whether this is the first time it is getting repaired, what your general health is like, and your surgeon's experience.  The 2 types of surgery are:  Open surgery - During an open surgery, the doctor makes 1 large cut near the hernia to repair it.  Minimally invasive surgery - \"Minimally invasive\" surgery lets the doctor make smaller cuts in the skin. They insert long, thin tools through the cuts. One of the tools has a camera (called a \"laparoscope\") on the end, which sends pictures to a TV screen. The doctor can look at the screen to see inside the body. Then, they use the long tools to do the surgery. They can control the tools directly, or with the help of a robot (this is called \"robot-assisted\" surgery).  If your hernia has reduced the blood supply to a loop of intestine, your doctor might need to remove that piece of intestine. Usually, they will then sew the intestine back together.  The recovery and aftercare for each type of hernia repair is different. Your doctor or nurse can tell you what to expect after your surgery.  All topics are updated as new evidence becomes available and our peer review process is complete.  This topic retrieved from [employer] on: Jan 11, 2024.  Topic 69849 Version 10.0  Release: 31.6.4 - C32.10  © 2024 " UpToDate, Inc. and/or its affiliates. All rights reserved.  figure 1: Abdominal wall hernias     Abdominal wall hernias can happen in different parts of the torso:  Incisional hernias happen along incisions from surgery.  Umbilical hernias happen at the belly button.  Epigastric hernias happen in the midline above the belly button.  Spigelian hernias happen to the left or right of the midline, where 2 layers of muscle meet.  Lumbar hernias (not shown) happen at the back.  Inguinal hernias happen in the groin area.  Femoral hernias happen where the thigh joins the torso.  Graphic 98347 Version 4.0  figure 2: Intestines bulging through hernia     In some cases, a loop of intestine can poke through a hernia. Often, surgeons can push the loop of intestine back in. But if the loop gets trapped or does not get enough blood, surgeons sometimes have to remove it and reconnect the 2 ends of intestine that are left.  Graphic 39160 Version 3.0  Consumer Information Use and Disclaimer   Disclaimer: This generalized information is a limited summary of diagnosis, treatment, and/or medication information. It is not meant to be comprehensive and should be used as a tool to help the user understand and/or assess potential diagnostic and treatment options. It does NOT include all information about conditions, treatments, medications, side effects, or risks that may apply to a specific patient. It is not intended to be medical advice or a substitute for the medical advice, diagnosis, or treatment of a health care provider based on the health care provider's examination and assessment of a patient's specific and unique circumstances. Patients must speak with a health care provider for complete information about their health, medical questions, and treatment options, including any risks or benefits regarding use of medications. This information does not endorse any treatments or medications as safe, effective, or approved for treating a  "specific patient. UpToDate, Inc. and its affiliates disclaim any warranty or liability relating to this information or the use thereof.The use of this information is governed by the Terms of Use, available at https://www.[employer]Lagan Technologiesuwer.com/en/know/clinical-effectiveness-terms. 2024© UpToDate, Inc. and its affiliates and/or licensors. All rights reserved.  Copyright   © 2024 UpToDate, Inc. and/or its affiliates. All rights reserved.          INFORMATION FOR PATIENTS  Dr. Ureña is a member of the Abdominal Core Health Quality Collaborative ([facility]) registry and routinely follows all of his hernia patients in this way.  Please see the information below. If you receive any email surveys from the [facility], please respond to these so that we can follow how you are doing after surgery.    Please take advantage of the information on the [facility] website regarding rehabilitation before and after abdominal core surgery. This information can be found by going to [email or web address], then by clicking the \"Patients\" heading, then clicking \"Abdominal Core Surgery Rehab,\" or by going directly to [email or web address]    Additionally, there is an [facility] mobile angel that provides a wealth of information regarding postoperative recovery including stretching, modification for activities of daily living, and exercises for core strength. This can be found by going to the Angel Store or Google Play store and searching \"[facility]\".    This information sheet tells you how your surgeon’s participation in the Abdominal Core Health Quality  Collaborative ([facility]) aims to improve care for you and many other patients with hernia disease and diseases of  the abdominal wall or abdominal core. Your surgeon will include your personal health information (such as your  name, birthdate, address, and health care treatment) in the Abdominal [facility] Health Quality Collaborative  confidential, secure data registry unless you request (as " explained below) that your personal health information  not be included. We hope you will allow your information to be included because doing so will help improve the  quality of care for patients with abdominal core health issues such as hernia, tumors, and infections and help  improve the ways surgeons prevent hernias from forming.     What is the Abdominal Core Health Quality Collaborative ([facility]QC)?  ->The [facility] is a large group of surgeons committed to improving the quality of care for patients who have  abdominal core health problems and often undergo surgery for those problems. These surgeons are also  committed to reducing the chance of abdominal core health problems from forming in the first place.     How does it work?  -> During the routine care of your abdominal core health problem, your surgeon collects information about you  and how you do after surgery as part of your confidential health care records.  -> Your surgeon will input information about you and your surgery into a confidential, secure registry maintained  by the [facility]. Only your surgeon, the [facility] , and, in very limited circumstances, the [place] Food and  Drug Administration (FDA) will have access to any information that identifies you.  -> Your identifiable personal health information (such as your name, birthdate, address, and health care treatment  information) must be entered into the registry in order to make it useful for your surgeon’s and the [facility]’s  quality improvement efforts and so that the scientific validity of registry data can be tested. If available, your  surgeon will also enter your email address, cellular phone number, and mailing address. Your personal health  information, in an anonymous form (meaning your identity cannot be matched to your information), is combined  with anonymous information from hundreds (or thousands) of hernia patients like you so that it can be used by  those interested in improving hernia  care other than your surgeon and the [facility].  -> Your data may be provided to the FDA for the purpose of informing the use of particular medical devices used to  care for patients with abdominal core problems and the prevention of those problems. Data provided to the FDA is  typically submitted in a de-identified form, although occasionally FDA may request access to your confidential  health information under its regulatory authority to monitor public health. This information may not be  completely anonymous; however, it will be kept in a secure location and any public reports or publications that are  generated from the registry for FDA purposes will not contain information that will allow you to be personally  identified. Any release of patient protected health information will only be performed consistent with applicable  local, state and federal laws, regulations and institutional policies.  -> [facility] information, in completely anonymous form, may be used by multiple groups interested in abdominal  core health problems including health care providers, hospitals, industry, insurance companies and researchers.  -> There is no monetary compensation to you for having your information included in the [facility] registry.  -> We anticipate about 9,000 to 12,000 patients’ information being entered into the registry each year, although  the number of patients may increase as more surgeons participate in the registry.  -> The [facility] will keep your personal health information in the registry for at least 15 years to help know what  happens after your hernia operation or other surgery or treatment on a long term basis. When your information is  no longer maintained as part of the registry, it will be deleted in a secure manner consistent with applicable  privacy and patient information requirements. Your personal health information in a de-identified (anonymous)  form may be maintained indefinitely by those who have used the  anonymous registry data.     How does this benefit patients like me?  -> The information gathered from many surgeons and patients is much better than information a single surgeon has  about abdominal core health problems with regards to best technique and best use of mesh for particular patients  and particular abdominal core health issues.  -> This information is used to improve the quality of care by finding the best ways to minimize complications, pain,  and the chance of your problem coming back.  -> Although the [facility] is not a research study, having your information included in the [facility] registry can make  you eligible for future research studies. You may be contacted by your surgeon or the [facility] if you are eligible for  future research studies, and you can find more information on [email or web address].     What are the risks involved?  -> Only your surgeon, the facility where your surgery occurred, the [facility] and in rare instances FDA will have  access to your personal health information input into the registry in an identifiable form. The main risk to you for  having your information entered into the [facility] registry is that your personal health information is  unintentionally disclosed to persons who are not authorized to know your information. Multiple industry-standard  safeguards are in place to prevent this, similar to the safeguards hospitals use to protect confidential health  information during routine care of patients.  -> No system that protects confidential personal health information is 100% effective. In the unlikely event that  your information is exposed, you will be personally notified of the incident and appropriate actions will be taken to  limit potential risks to you.  -> Your participation in the registry is completely voluntary. Not including your information in the [facility] registry  will not have an impact on your care.  -> If you do not wish to have your information included in the [facility] registry  for quality improvement in your  care, please inform your surgeon or contact [facility] (see information, below; If information already has been  collected, it will remain in the registry, but no new information will be collected after 30 days of your notifying  your surgeon or [facility] of your decision not to participate).     What can I do to help?  -> A very important part of your surgeon’s care of you and of the [facility] is finding out how your abdominal core  affects your life before and after surgery.  -> In addition to clinic visits with your surgeon to follow your progress, your surgeon and the [facility] may use  different ways to follow your progress after surgery including phone calls, email, text messages, or regular mail. If  you provide your email address or cellular telephone number, your surgeon and the [facility] may send you emails  or a text messages with a link to a confidential questionnaire for you to complete. You can choose not to receive  emails or text messages as described below. Completed questionnaires become part of the anonymous data that  [facility] uses to improve the quality of care for hernia patients. The email will come from [email or web address]; please don’t  delete or ‘junk filter’ this email.  -> You may be asked to complete a follow up questionnaire a few times over the next several years which helps  your surgeon and the [facility] see how you are doing.     Will I receive email and other electronic communication?  -> Unless you choose not to participate in the [facility] (see below), emails and text messages asking you to complete  an [facility] questionnaire may be sent to you in an unencrypted format and will have no additional personal information  about you, other than possibly that you are a patient of your surgeon (the questionnaire itself will be completed on a  secure portal to the [facility] registry).  -> In deciding if you want unencrypted emails or unencrypted text messages to be sent to you, you should consider  that  communication of protected health information by unencrypted email or unencrypted text message involves  potential privacy and security risks. Specifically, unencrypted email and text message transmissions can be  intercepted in transmission or misdirected, allowing third parties to view the information. You should also consider  that your cellular telephone service provider may impose a data or other charge on you to receive a text message.  You should consider asking that any communication of sensitive information be completed by telephone or mail. If  you do not wish to receive email or text messages concerning the [facility] and questionnaires about your hernia,  please inform your surgeon or contact [facility] (see information, below).     What if I have questions or choose not to participate in the [facility]?  -> Should you have any questions, your surgeon can help provide answers for you. General information, registry  updates, and progress of the [facility] can be found at [email or web address]. You can also send an email to  [email or web address] or call [phone] any time with questions about the registry.  -> If you do not want your personal health information included in the [facility] database or you do not want to  receive emails, text messages, or other communications from [facility], please inform your surgeon or contact  [facility] (by email to [email or web address] or call [phone]).

## 2025-03-10 NOTE — PATIENT INSTRUCTIONS
"Patient Education     Abdominal wall hernias   The Basics   Written by the doctors and editors at [facility]   What is an abdominal wall hernia? -- The internal organs and tissues in the belly are held in place by a tough outer wall of tissue called the \"abdominal wall.\" An abdominal hernia is an area in that wall that is weak or torn. Often, when there is a hernia, organs or tissues that are normally held in place by the abdominal wall bulge or stick out through the weak or torn spot.  The size of the bulge can change depending on how much pressure is put on the abdominal wall. For example, straining when coughing or having a bowel movement can make a hernia look bigger. Lying down overnight can make the hernia look smaller, or even disappear, in the morning.  There are many different kinds of abdominal wall hernias (figure 1).  What are the symptoms of abdominal wall hernias? -- Abdominal wall hernias do not always cause symptoms. When they do, they can cause some or all of these symptoms:  A bulge somewhere on the trunk of the body - This bulge can be so small that you don't even realize it's there.  Pain, especially when coughing, straining, or using nearby muscles  A pulling sensation around the bulge  Nausea or vomiting if part of the intestine is blocked in the hernia  Abdominal wall hernias can balloon out and form a sac. That sac can hold a loop of intestine or a piece of fat that should normally be tucked inside of the belly. This can be painful and even dangerous if the tissue in the hernia gets trapped and unable to slide back into the belly. When this happens, the tissue does not get enough blood, so it can become swollen or even die (figure 2).  Should I see a doctor or nurse? -- Yes. See a doctor or nurse if you have any of the symptoms of a hernia. In most cases, doctors can diagnose a hernia just by doing an exam. During the exam, the doctor might ask you to cough or bear down while pressing on your " "hernia. This might be uncomfortable, but it is necessary to find the source of the problem.  Most of the time, the contents of the hernia can be \"reduced,\" or gently pushed back into the belly. But sometimes, the hernia gets trapped and won't go back in. If that happens, the tissue that is trapped can get damaged.  If you develop severe pain around a hernia bulge or feel sick, call your doctor or surgeon right away.  How are hernias treated? -- Not all hernias need treatment right away. But many do need to be repaired with surgery.  Surgeons can repair most hernias in 1 of 2 ways. The right surgery for you depends on the size of your hernia, where on the abdominal wall it is, whether this is the first time it is getting repaired, what your general health is like, and your surgeon's experience.  The 2 types of surgery are:  Open surgery - During an open surgery, the doctor makes 1 large cut near the hernia to repair it.  Minimally invasive surgery - \"Minimally invasive\" surgery lets the doctor make smaller cuts in the skin. They insert long, thin tools through the cuts. One of the tools has a camera (called a \"laparoscope\") on the end, which sends pictures to a TV screen. The doctor can look at the screen to see inside the body. Then, they use the long tools to do the surgery. They can control the tools directly, or with the help of a robot (this is called \"robot-assisted\" surgery).  If your hernia has reduced the blood supply to a loop of intestine, your doctor might need to remove that piece of intestine. Usually, they will then sew the intestine back together.  The recovery and aftercare for each type of hernia repair is different. Your doctor or nurse can tell you what to expect after your surgery.  All topics are updated as new evidence becomes available and our peer review process is complete.  This topic retrieved from [facility] on: Jan 11, 2024.  Topic 29174 Version 10.0  Release: 31.6.4 - C32.10  © 2024 " UpToDate, Inc. and/or its affiliates. All rights reserved.  figure 1: Abdominal wall hernias     Abdominal wall hernias can happen in different parts of the torso:  Incisional hernias happen along incisions from surgery.  Umbilical hernias happen at the belly button.  Epigastric hernias happen in the midline above the belly button.  Spigelian hernias happen to the left or right of the midline, where 2 layers of muscle meet.  Lumbar hernias (not shown) happen at the back.  Inguinal hernias happen in the groin area.  Femoral hernias happen where the thigh joins the torso.  Graphic 14531 Version 4.0  figure 2: Intestines bulging through hernia     In some cases, a loop of intestine can poke through a hernia. Often, surgeons can push the loop of intestine back in. But if the loop gets trapped or does not get enough blood, surgeons sometimes have to remove it and reconnect the 2 ends of intestine that are left.  Graphic 50752 Version 3.0  Consumer Information Use and Disclaimer   Disclaimer: This generalized information is a limited summary of diagnosis, treatment, and/or medication information. It is not meant to be comprehensive and should be used as a tool to help the user understand and/or assess potential diagnostic and treatment options. It does NOT include all information about conditions, treatments, medications, side effects, or risks that may apply to a specific patient. It is not intended to be medical advice or a substitute for the medical advice, diagnosis, or treatment of a health care provider based on the health care provider's examination and assessment of a patient's specific and unique circumstances. Patients must speak with a health care provider for complete information about their health, medical questions, and treatment options, including any risks or benefits regarding use of medications. This information does not endorse any treatments or medications as safe, effective, or approved for treating a  "specific patient. UpToDate, Inc. and its affiliates disclaim any warranty or liability relating to this information or the use thereof.The use of this information is governed by the Terms of Use, available at https://www.woltersLimeadeuwer.com/en/know/clinical-effectiveness-terms. 2024© UpToDate, Inc. and its affiliates and/or licensors. All rights reserved.  Copyright   © 2024 Up[employer]Date, Inc. and/or its affiliates. All rights reserved.          INFORMATION FOR PATIENTS  Dr. Ureña is a member of the Abdominal Core Health Quality Collaborative ([facility]) registry and routinely follows all of his hernia patients in this way.  Please see the information below. If you receive any email surveys from the [facility], please respond to these so that we can follow how you are doing after surgery.    Please take advantage of the information on the [facility] website regarding rehabilitation before and after abdominal core surgery. This information can be found by going to [email or web address], then by clicking the \"Patients\" heading, then clicking \"Abdominal Core Surgery Rehab,\" or by going directly to [email or web address]    Additionally, there is an [facility] mobile angel that provides a wealth of information regarding postoperative recovery including stretching, modification for activities of daily living, and exercises for core strength. This can be found by going to the Angel Store or Google Play store and searching \"[facility]\".    This information sheet tells you how your surgeon’s participation in the Abdominal Core Health Quality  Collaborative ([facility]) aims to improve care for you and many other patients with hernia disease and diseases of  the abdominal wall or abdominal core. Your surgeon will include your personal health information (such as your  name, birthdate, address, and health care treatment) in the Abdominal [facility] Health Quality Collaborative  confidential, secure data registry unless you request (as " explained below) that your personal health information  not be included. We hope you will allow your information to be included because doing so will help improve the  quality of care for patients with abdominal core health issues such as hernia, tumors, and infections and help  improve the ways surgeons prevent hernias from forming.     What is the Abdominal Core Health Quality Collaborative ([facility]QC)?  ->The [facility] is a large group of surgeons committed to improving the quality of care for patients who have  abdominal core health problems and often undergo surgery for those problems. These surgeons are also  committed to reducing the chance of abdominal core health problems from forming in the first place.     How does it work?  -> During the routine care of your abdominal core health problem, your surgeon collects information about you  and how you do after surgery as part of your confidential health care records.  -> Your surgeon will input information about you and your surgery into a confidential, secure registry maintained  by the [facility]. Only your surgeon, the [facility] , and, in very limited circumstances, the [place] Food and  Drug Administration (FDA) will have access to any information that identifies you.  -> Your identifiable personal health information (such as your name, birthdate, address, and health care treatment  information) must be entered into the registry in order to make it useful for your surgeon’s and the [facility]’s  quality improvement efforts and so that the scientific validity of registry data can be tested. If available, your  surgeon will also enter your email address, cellular phone number, and mailing address. Your personal health  information, in an anonymous form (meaning your identity cannot be matched to your information), is combined  with anonymous information from hundreds (or thousands) of hernia patients like you so that it can be used by  those interested in improving hernia  care other than your surgeon and the [facility].  -> Your data may be provided to the FDA for the purpose of informing the use of particular medical devices used to  care for patients with abdominal core problems and the prevention of those problems. Data provided to the FDA is  typically submitted in a de-identified form, although occasionally FDA may request access to your confidential  health information under its regulatory authority to monitor public health. This information may not be  completely anonymous; however, it will be kept in a secure location and any public reports or publications that are  generated from the registry for FDA purposes will not contain information that will allow you to be personally  identified. Any release of patient protected health information will only be performed consistent with applicable  local, state and federal laws, regulations and institutional policies.  -> [facility] information, in completely anonymous form, may be used by multiple groups interested in abdominal  core health problems including health care providers, hospitals, industry, insurance companies and researchers.  -> There is no monetary compensation to you for having your information included in the [facility] registry.  -> We anticipate about 9,000 to 12,000 patients’ information being entered into the registry each year, although  the number of patients may increase as more surgeons participate in the registry.  -> The [facility] will keep your personal health information in the registry for at least 15 years to help know what  happens after your hernia operation or other surgery or treatment on a long term basis. When your information is  no longer maintained as part of the registry, it will be deleted in a secure manner consistent with applicable  privacy and patient information requirements. Your personal health information in a de-identified (anonymous)  form may be maintained indefinitely by those who have used the  anonymous registry data.     How does this benefit patients like me?  -> The information gathered from many surgeons and patients is much better than information a single surgeon has  about abdominal core health problems with regards to best technique and best use of mesh for particular patients  and particular abdominal core health issues.  -> This information is used to improve the quality of care by finding the best ways to minimize complications, pain,  and the chance of your problem coming back.  -> Although the [facility] is not a research study, having your information included in the [facility] registry can make  you eligible for future research studies. You may be contacted by your surgeon or the [facility] if you are eligible for  future research studies, and you can find more information on [email or web address].     What are the risks involved?  -> Only your surgeon, the facility where your surgery occurred, the [facility] and in rare instances FDA will have  access to your personal health information input into the registry in an identifiable form. The main risk to you for  having your information entered into the [facility] registry is that your personal health information is  unintentionally disclosed to persons who are not authorized to know your information. Multiple industry-standard  safeguards are in place to prevent this, similar to the safeguards hospitals use to protect confidential health  information during routine care of patients.  -> No system that protects confidential personal health information is 100% effective. In the unlikely event that  your information is exposed, you will be personally notified of the incident and appropriate actions will be taken to  limit potential risks to you.  -> Your participation in the registry is completely voluntary. Not including your information in the [facility] registry  will not have an impact on your care.  -> If you do not wish to have your information included in the [facility] registry  for quality improvement in your  care, please inform your surgeon or contact [facility] (see information, below; If information already has been  collected, it will remain in the registry, but no new information will be collected after 30 days of your notifying  your surgeon or [facility] of your decision not to participate).     What can I do to help?  -> A very important part of your surgeon’s care of you and of the [facility] is finding out how your abdominal core  affects your life before and after surgery.  -> In addition to clinic visits with your surgeon to follow your progress, your surgeon and the [facility] may use  different ways to follow your progress after surgery including phone calls, email, text messages, or regular mail. If  you provide your email address or cellular telephone number, your surgeon and the [facility] may send you emails  or a text messages with a link to a confidential questionnaire for you to complete. You can choose not to receive  emails or text messages as described below. Completed questionnaires become part of the anonymous data that  [facility] uses to improve the quality of care for hernia patients. The email will come from [email or web address]; please don’t  delete or ‘junk filter’ this email.  -> You may be asked to complete a follow up questionnaire a few times over the next several years which helps  your surgeon and the [facility] see how you are doing.     Will I receive email and other electronic communication?  -> Unless you choose not to participate in the [facility] (see below), emails and text messages asking you to complete  an [facility] questionnaire may be sent to you in an unencrypted format and will have no additional personal information  about you, other than possibly that you are a patient of your surgeon (the questionnaire itself will be completed on a  secure portal to the [facility] registry).  -> In deciding if you want unencrypted emails or unencrypted text messages to be sent to you, you should consider  that  communication of protected health information by unencrypted email or unencrypted text message involves  potential privacy and security risks. Specifically, unencrypted email and text message transmissions can be  intercepted in transmission or misdirected, allowing third parties to view the information. You should also consider  that your cellular telephone service provider may impose a data or other charge on you to receive a text message.  You should consider asking that any communication of sensitive information be completed by telephone or mail. If  you do not wish to receive email or text messages concerning the [facility] and questionnaires about your hernia,  please inform your surgeon or contact [facility] (see information, below).     What if I have questions or choose not to participate in the [facility]?  -> Should you have any questions, your surgeon can help provide answers for you. General information, registry  updates, and progress of the [facility] can be found at [email or web address]. You can also send an email to  [email or web address] or call [phone] any time with questions about the registry.  -> If you do not want your personal health information included in the [facility] database or you do not want to  receive emails, text messages, or other communications from [facility], please inform your surgeon or contact  [facility] (by email to [email or web address] or call [phone]).

## 2025-03-10 NOTE — PROGRESS NOTES
"Patricio Ayers  [id number]   03/10/25  1:14 PM    HPI/Subjective:  Patricio Ayers is a 39 y.o. male with history of incarcerated ventral hernia s/p Open primary hernia repair of incarcerated anterior abdominal wall hernia (2023), recurrent incarcerated ventral hernia s/p  laparoscopic ventral hernia repair, [facility], small bowel resection (2024) who is referred to clinic by Gabino La MD for evaluation of a/an recurrent ventral incisional hernia(s).    They note a bulge*** which {DOES/ DOES NOT:22181} seem to be increasing in size over time.    Symptomatically, this patient experiences pain {frequency:57278}, which is {COURSE IMPROVING/WORSENING SX HPI:29678}. ***    They have had {SEVERAL/MANY/NO:85206::\"no\"} episode(s) of incarceration***.    They have had {SEVERAL/MANY/NO:37709::\"no\"} episode(s) of obstructive symptoms attributed to the hernia***.    They have had  two prior repair(s). Review of operative notes indicates that the most recent repair in 2024 was completed at the time of a small bowel resection and therefore was done without mesh and the initial repair in 2023 was also completed without mesh .    Past surgical history is otherwise notable for laparoscopic ventral hernia repair, [facility], small bowel resection (9/11/2024), Open primary hernia repair of incarcerated anterior abdominal wall hernia (2/11/2023)    From a pain history standpoint,  Behavioral health history - None  Opioid substance use - None    From a functional/activity standpoint,  Ability to perform ADLs in 30 days prior to surgery - {sjzfunctionalstatus:13772::\"Independent\"}  Employment - {sjzemployment:80899}  Sporting Activity - {sjzsportingactivity:97786}  BMI - There is no height or weight on file to calculate BMI.    Past medical history is significant for:    Hepatic insufficiency or liver failure - No  Ascites - No  Hypertension - No  Diabetes mellitus - No   Dialysis (acute or chronic renal failure requiring dialysis within 2 weeks " prior to surgery) - None  COPD - No  Dyspnea - No  Antiplatelet medications excluding aspirin - No  Anticoagulation medications - No  Aspirin - No  Immunosuppression - No  Nicotine use in relation to OR date - No  History of AAA - No  Collagen vascular disorder - No  Congestive heart failure - No  Active pregnancy - No    Review of Systems   Constitutional:  Negative for activity change, appetite change, chills, diaphoresis, fatigue, fever and unexpected weight change.   Respiratory:  Negative for cough, chest tightness and shortness of breath.    Cardiovascular:  Negative for palpitations and leg swelling.   Gastrointestinal:  Negative for abdominal pain, anal bleeding, blood in stool, constipation, diarrhea, nausea, rectal pain and vomiting.   Genitourinary:  Negative for difficulty urinating, dysuria and hematuria.   Neurological:  Negative for dizziness, weakness and light-headedness.          Objective:  There is no height or weight on file to calculate BMI.  Physical Exam  Vitals reviewed. Exam conducted with a chaperone present.   Constitutional:       General: He is not in acute distress.     Appearance: Normal appearance. He is not ill-appearing.   HENT:      Head: Normocephalic.      Mouth/Throat:      Mouth: Mucous membranes are moist.   Eyes:      Extraocular Movements: Extraocular movements intact.      Pupils: Pupils are equal, round, and reactive to light.   Cardiovascular:      Rate and Rhythm: Normal rate and regular rhythm.      Pulses: Normal pulses.      Heart sounds: Normal heart sounds. No murmur heard.  Pulmonary:      Effort: Pulmonary effort is normal. No respiratory distress.      Breath sounds: Normal breath sounds. No wheezing, rhonchi or rales.   Chest:      Chest wall: No tenderness.   Abdominal:      General: There is no distension.      Palpations: There is no mass.      Tenderness: There is no abdominal tenderness. There is no guarding or rebound.      Hernia: A hernia is present.  "  Musculoskeletal:         General: No swelling or deformity.      Cervical back: Neck supple. No rigidity.      Right lower leg: No edema.      Left lower leg: No edema.   Skin:     General: Skin is warm.      Coloration: Skin is not jaundiced or pale.   Neurological:      General: No focal deficit present.      Mental Status: He is alert and oriented to person, place, and time. Mental status is at baseline.   Psychiatric:         Mood and Affect: Mood normal.         Behavior: Behavior normal.         Thought Content: Thought content normal.         Judgment: Judgment normal.         Imaging consisting    was reviewed personally and was notable for ***.    Assessment/Plan:  Patricio Ayers is a 39 y.o. male with history of ncarcerated ventral hernia s/p Open primary hernia repair of incarcerated anterior abdominal wall hernia (2023), recurrent incarcerated ventral hernia s/p  laparoscopic ventral hernia repair, [facility], small bowel resection (2024) who presents with a/an {Desc; symptomatic/asymptomatic:21247} recurrent ventral incisional hernia(s), having undergone two prior repairs.    We will plan to [facility]}.    I will see the patient back in the office in {sjzreturnperiod:71648} for *** or sooner if needed.    The patient {DID/DID NOT:62979::\"did\"} provide consent for surgery and participation in the [facility] at this clinic visit.    Portions of medical record reviewed for pertinent issues including active problem list, medication list, allergies, social history, health maintenance, notes from previous encounters, lab results, and imaging.     Palomo Ureña MD  Assistant Professor of Surgery  Division of General Surgery  Department of Surgery      9/11/2024 Procedure  Repair Ventral Hernia Laparoscopy, [facility], Small Bowel Resection   Indications: Patricio Ayers is an 39 y.o. male who is having surgery for Generalized abdominal pain [R10.84]  Ventral hernia with obstruction and without gangrene [K43.6].  The " patient presented to the emergency department with 1 day of worsening epigastric abdominal pain associate with nausea and emesis.  He had a history of prior incarcerated ventral hernia that was repaired emergently 1 year ago.  He underwent CT scan, which showed a recurrent ventral hernia and associated small bowel obstruction.  I explained to him the risks and benefits of going to the operating room for a laparoscopic ventral hernia repair, lysis of adhesions, and possible small bowel resection.  These risk include risk of bleeding, infection, or injury to surround structures.  The patient agreed to proceed with the operation.     The patient was seen in the preoperative area. The risks, benefits, complications, treatment options, non-operative alternatives, expected recovery and outcomes were discussed with the patient. The possibilities of reaction to medication, pulmonary aspiration, injury to surrounding structures, bleeding, recurrent infection, the need for additional procedures, failure to diagnose a condition, and creating a complication requiring transfusion or operation were discussed with the patient. The patient concurred with the proposed plan, giving informed consent.  The site of surgery was properly noted/marked if necessary per policy. The patient has been actively warmed in preoperative area. Preoperative antibiotics have been ordered and given within 1 hours of incision. Venous thrombosis prophylaxis have been ordered including bilateral sequential compression devices     Procedure Details:   The patient was brought to the operating room, placed supine on the operating table, and general endotracheal esthesia was induced.  His abdomen was then prepped and draped sterilely.  We began by making an incision in the left upper quadrant at Russo's point, and used an optical view trocar to get access to the abdomen.  The abdomen was then insufflated to 15 mmHg.  On inspection of the abdomen he was noted  to have multiple adhesions of his small bowel as well as omentum to his anterior abdominal wall along with a recurrent ventral hernia containing a portion of the small bowel.  We performed some laparoscopic lysis of adhesions, taking down a majority of the omental adhesions as well as some of the small bowel adhesions, but there was a portion of the small bowel that was densely adherent to the anterior abdominal wall and the peritoneal hernia sac.  We therefore made an incision over his prior hernia repair, and dissected out the hernia sac.  Through meticulous dissection we are able to take down the adhesions.  We then eviscerated the small bowel.  He was noted to have an area distal to his incarcerated bowel which had multiple interloop adhesions and resulting accordioning of the small bowel on itself.  Lysis of these adhesions resulted in multiple serosal tears.  Given that he would likely reform these adhesions, we elected to resect this portion of the bowel.  The entire section of bowel measured 30 cm in length.  We used blue loads on a 75 mm TICO stapler to transect the bowel proximally and medially.  We then used a LigaSure device in order to take the mesentery.  We performed a stapled side-to-side, functional end-to-end anastomosis using another 75 mm blue load on the TICO stapler.  We closed the common enterotomy with another blue load.  We oversewed the common enterotomy using 2-0 Vicryl sutures.  We then closed the mesenteric defect using a 2-0 silk suture.  We then placed the bowel back into the abdomen.  We closed the recurrent ventral hernia using 0 PDS suture in running fashion.  We then closed the skin incisions using staples.  At the end of the procedure all needle, sponge, and instrument counts were correct.  The patient tolerated the procedure well and was brought to the postanesthesia care unit for recovery.      2/11/2023 Procedure  Open primary hernia repair of incarcerated anterior abdominal wall  hernia    Description of Procedure Informed consent was obtained from the patient. The patient was brought back into the operating room placed on the operating table in the supine position. General anesthesia was induced. Time- out SCDs and antibiotics were given according to protocol. His abdomen was prepped and draped according to standard sterile fashion. Local anesthesia was instilled in the midline above the hernia and more superiorly. A knife was used to make an incision through the skin above the hernia and slightly more proximally. Bovie electrocautery was used to take down this incision to the anterior rectus sheath superiorly above the hernia. The abdomen was entered sharply and the linea alba was opened inferiorly to the level of the hernia. Once the hernia was entered there was release of hemorrhagic ascites. There was a hematoma of the small bowel but there did not appear to be xander necrosis. The distal portion of the omentum was also within the hernia sac and it appeared chronically incarcerated and was atrophic and the narrow with stenotic areas in between normal appearing omentum. Because it appeared narrow and stenotic, and could potentially be a lead point for causing an internal hernia in the future, decision was made to resect the abnormal distal part. It was suture ligated transected and sent off as a specimen. The urachal cyst that was seen on the preoperative CT was identified within the hernia sac. This was carefully dissected away so that the anterior rectus sheath bilaterally could be closed. Because he had no infection or symptoms from this tract preoperatively, decision was made to leave it in situ and not proceed with resection. The small bowel was exteriorized and ran from the ligament of Treitz to the cecum. 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 These were not easily   and because he had had any obstructive symptoms prior, they were left alone. The bowel distal to the obstruction had few were areas of congenitally he has an and the bowel leading up to the terminal ileum appeared normal. The incarcerated small bowel was again inspected and the serosal hematoma was stable and there was no evidence of necrosis. The hematoma was gently scraped off to ensure that there was no full- thickness injury, which there was not. And the bowel was placed underneath the remaining omentum. The abdomen was then closed starting inferior and superiorly with a 1. PDS running towards the middle. The skin was closed with Monocryl and Steri- Strips. He tolerated the procedure well and was transferred to PACU in stable condition.

## 2025-03-10 NOTE — PROGRESS NOTES
Subjective   Patient ID: Patricio Ayers is a 39 y.o. male who presents for recurrent ventral hernia     The patient is a 39-year-old man with a history of recurrent ventral hernia, whom I had done an incarcerated ventral hernia repair and small bowel resection on September 11, 2024.  Prior to this he had undergone another ventral hernia repair.  I did not place any mesh given that we were doing a small bowel resection at the time of surgery.  He had been doing well, but beginning of this year he started noticing a new bulge along his upper midline incision.  He denies any obstructive symptoms.  He is eating normally, and having normal bowel function.  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    Review of Systems   Constitutional:  Negative for activity change, appetite change, chills, diaphoresis and fever.   Respiratory:  Negative for shortness of breath.    Cardiovascular:  Negative for chest pain.   Neurological:  Negative for dizziness.   Psychiatric/Behavioral:  Negative for agitation and confusion.    All other systems reviewed and are negative.      Objective   Physical Exam  Constitutional:       Appearance: Normal appearance.   Pulmonary:      Effort: Pulmonary effort is normal.   Abdominal:      Comments: Large, recurrent ventral incisional hernia along the upper midline incision.  Soft, partially reducible.     Neurological:      General: No focal deficit present.      Mental Status: He is alert.   Psychiatric:         Mood and Affect: Mood normal.         Assessment/Plan   The patient is a 39-year-old man who presents for recurrent-recurrent ventral hernia.  His last repair was in September 2024.  At that time we resected a portion of small bowel that had multiple interloop adhesions.  We therefore did not place any mesh, and discussed that his risk of recurrence was high.  We will put in for a consult with a hernia specialist, Dr. Ureña, for evaluation of the recurrent  hernia.  He denies any obstructive symptoms.  He is eating normally, and having normal bowel function.  I discussed signs, and symptoms to watch out for in case he were to have another incarceration or obstruction.  The patient voiced understanding, and would go to the emergency department if he has any worsening symptoms.         Gabino La MD 03/10/25 9:17 AM

## 2025-03-19 ENCOUNTER — APPOINTMENT (OUTPATIENT)
Dept: SURGERY | Facility: CLINIC | Age: 40
End: 2025-03-19
Payer: MEDICARE

## 2025-03-24 ENCOUNTER — HOSPITAL ENCOUNTER (OUTPATIENT)
Dept: RADIOLOGY | Facility: HOSPITAL | Age: 40
Discharge: HOME | End: 2025-03-24
Payer: MEDICARE

## 2025-03-24 DIAGNOSIS — K43.2 INCISIONAL HERNIA, WITHOUT OBSTRUCTION OR GANGRENE: ICD-10-CM

## 2025-03-24 PROCEDURE — 74176 CT ABD & PELVIS W/O CONTRAST: CPT

## 2025-03-24 PROCEDURE — 74176 CT ABD & PELVIS W/O CONTRAST: CPT | Performed by: RADIOLOGY

## 2025-03-26 ENCOUNTER — APPOINTMENT (OUTPATIENT)
Dept: SURGERY | Facility: CLINIC | Age: 40
End: 2025-03-26
Payer: MEDICARE

## 2025-04-18 ENCOUNTER — APPOINTMENT (OUTPATIENT)
Dept: SURGERY | Facility: CLINIC | Age: 40
End: 2025-04-18
Payer: MEDICARE

## 2025-04-18 VITALS
HEART RATE: 74 BPM | BODY MASS INDEX: 39.3 KG/M2 | WEIGHT: 306.2 LBS | SYSTOLIC BLOOD PRESSURE: 128 MMHG | DIASTOLIC BLOOD PRESSURE: 89 MMHG | HEIGHT: 74 IN

## 2025-04-18 DIAGNOSIS — K43.6 VENTRAL HERNIA WITH OBSTRUCTION AND WITHOUT GANGRENE: Primary | ICD-10-CM

## 2025-04-18 PROCEDURE — G2211 COMPLEX E/M VISIT ADD ON: HCPCS | Performed by: SURGERY

## 2025-04-18 PROCEDURE — 1036F TOBACCO NON-USER: CPT | Performed by: SURGERY

## 2025-04-18 PROCEDURE — 3008F BODY MASS INDEX DOCD: CPT | Performed by: SURGERY

## 2025-04-18 PROCEDURE — 99204 OFFICE O/P NEW MOD 45 MIN: CPT | Performed by: SURGERY

## 2025-04-18 RX ORDER — SCOPOLAMINE 1 MG/3D
1 PATCH, EXTENDED RELEASE TRANSDERMAL
OUTPATIENT
Start: 2025-04-18 | End: 2025-04-21

## 2025-04-18 RX ORDER — ALVIMOPAN 12 MG/1
12 CAPSULE ORAL ONCE
OUTPATIENT
Start: 2025-04-18 | End: 2025-04-18

## 2025-04-18 RX ORDER — GABAPENTIN 300 MG/1
300 CAPSULE ORAL ONCE
OUTPATIENT
Start: 2025-04-18 | End: 2025-04-18

## 2025-04-18 RX ORDER — HEPARIN SODIUM 5000 [USP'U]/ML
5000 INJECTION, SOLUTION INTRAVENOUS; SUBCUTANEOUS ONCE
OUTPATIENT
Start: 2025-04-18 | End: 2025-04-18

## 2025-04-18 RX ORDER — ACETAMINOPHEN 325 MG/1
975 TABLET ORAL ONCE
OUTPATIENT
Start: 2025-04-18 | End: 2025-04-18

## 2025-05-13 ENCOUNTER — CLINICAL SUPPORT (OUTPATIENT)
Dept: PREADMISSION TESTING | Facility: HOSPITAL | Age: 40
End: 2025-05-13

## 2025-05-13 NOTE — CPM/PAT H&P
CPM/PAT Evaluation       Name: Patricio Ayers (Patricio Ayers)  /Age: 1985/39 y.o.     {[facility] Visit Type:11293}      Chief Complaint: ***    HPI  Patricio Ayers is scheduled for Repair Ventral Hernia Robot-Assisted, Repair Robot-Assisted Abdominal Wall - Bilateral with Dr. Ureña on 25.  Medical History[1]    Surgical History[2]    Patient  has no history on file for sexual activity.    Family History[3]    Allergies[4]    Prior to Admission medications    Medication Sig Start Date End Date Taking? Authorizing Provider   cholecalciferol, vitamin D3, (D3-2000 ORAL) Take 2,000 Units by mouth once daily.    Historical Provider, MD   ibuprofen 200 mg tablet Take 1 tablet (200 mg) by mouth every 6 hours if needed for mild pain (1 - 3).    Historical Provider, MD   omega 3-dha-epa-fish oil (Fish OiL) 1,000 mg (120 mg-180 mg) capsule Take 1 capsule (1,000 mg) by mouth once daily.    Historical Provider, MD        [facility] Physical Exam     Airway      Visit Vitals  Smoking Status Never       DASI Risk Score    No data to display       Caprini DVT Assessment    No data to display       Modified Frailty Index    No data to display       [id number] Stroke Risk Points  Current as of just now        N/A 0 to 9 Points:      Last Change: N/A          The [id number]-VPLc risk score (Lip GH, et al. 2009. © 2010 American College [employer]) quantifies the risk of stroke for a patient with atrial fibrillation. For patients without atrial fibrillation or under the age of 18 this score appears as N/A. Higher score values generally indicate higher risk of stroke.        This score is not applicable to this patient. Components are not calculated.          Revised Cardiac Risk Index    No data to display       Apfel Simplified Score    No data to display       Risk Analysis Index Results This Encounter    No data found in the last 10 encounters.       Prodigy: High Risk  Total Score: 8              Prodigy Gender  Score          ARISCAT Score for Postoperative Pulmonary Complications    No data to display       [place] Perioperative Risk for Myocardial Infarction or Cardiac Arrest (JOSH)    No data to display       Testing/Diagnostic:   CT ABDOMEN PELVIS WO IV CONTRAST; 3/24/2025   IMPRESSION:  Midline supraumbilical large broad-based ventral hernia containing  fat as well as nondilated non thickened segments of small bowel. No  bowel obstruction.  Separate small elongated fat containing periumbilical hernia without  inflammation.      Normal appendix.      Small hiatal hernia.      Contracted gallbladder containing gallstones. No biliary dilation.       XR ABDOMEN 2 VIEWS SUPINE AND ERECT OR DECUB 9/19/2024   IMPRESSION:  1. Interval decrease in small bowel dilatation with scattered fluid  levels demonstrated with mild gas-filled loops of large bowel with  improving small-bowel obstruction    Patient Specialist/PCP:   General Surgery: Palomo Ureña MD4/18/25 history of incarcerated ventral hernia s/p Open primary hernia repair of incarcerated anterior abdominal wall hernia (2023), recurrent incarcerated ventral hernia s/p  laparoscopic ventral hernia repair, [facility], small bowel resection (2024) who is referred to clinic by Gabino La MD for evaluation of a/an recurrent ventral incisional hernia(s).     General Surgery: Gabino CHAMBERS MD 3/10/25 presents for recurrent ventral hernia   We will put in for a consult with a hernia specialist, Dr. Ureña, for evaluation of the recurrent hernia.       ONLY    Daiana Geronimo RN  Pre-Admission Testing   Assessment and Plan:     {[facility] ASSESSMENT AND PLAN:87748}             [1]   Past Medical History:  Diagnosis Date    Ventral hernia    [2]   Past Surgical History:  Procedure Laterality Date    HERNIA REPAIR  02/11/2023    Open primary hernia repair of incarcerated anterior abdominal wall hernia    SMALL INTESTINE SURGERY  09/11/2024   [3] No family history  on file.  [4] No Known Allergies

## 2025-05-19 ENCOUNTER — PRE-ADMISSION TESTING (OUTPATIENT)
Dept: PREADMISSION TESTING | Facility: HOSPITAL | Age: 40
End: 2025-05-19
Payer: COMMERCIAL

## 2025-05-19 VITALS
HEIGHT: 74 IN | TEMPERATURE: 97.6 F | WEIGHT: 304 LBS | BODY MASS INDEX: 39.01 KG/M2 | DIASTOLIC BLOOD PRESSURE: 87 MMHG | HEART RATE: 80 BPM | OXYGEN SATURATION: 96 % | SYSTOLIC BLOOD PRESSURE: 126 MMHG

## 2025-05-19 DIAGNOSIS — K43.6 VENTRAL HERNIA WITH OBSTRUCTION AND WITHOUT GANGRENE: ICD-10-CM

## 2025-05-19 DIAGNOSIS — Z01.818 PREOPERATIVE EXAMINATION: Primary | ICD-10-CM

## 2025-05-19 LAB
ABO GROUP (TYPE) IN BLOOD: NORMAL
ANION GAP SERPL CALC-SCNC: 13 MMOL/L (ref 10–20)
ANTIBODY SCREEN: NORMAL
BUN SERPL-MCNC: 11 MG/DL (ref 6–23)
CALCIUM SERPL-MCNC: 10.1 MG/DL (ref 8.6–10.6)
CHLORIDE SERPL-SCNC: 102 MMOL/L (ref 98–107)
CO2 SERPL-SCNC: 31 MMOL/L (ref 21–32)
CREAT SERPL-MCNC: 0.59 MG/DL (ref 0.5–1.3)
EGFRCR SERPLBLD CKD-EPI 2021: >90 ML/MIN/1.73M*2
ERYTHROCYTE [DISTWIDTH] IN BLOOD BY AUTOMATED COUNT: 12 % (ref 11.5–14.5)
GLUCOSE SERPL-MCNC: 139 MG/DL (ref 74–99)
HCT VFR BLD AUTO: 42.2 % (ref 41–52)
HGB BLD-MCNC: 14.1 G/DL (ref 13.5–17.5)
MCH RBC QN AUTO: 28.2 PG (ref 26–34)
MCHC RBC AUTO-ENTMCNC: 33.4 G/DL (ref 32–36)
MCV RBC AUTO: 84 FL (ref 80–100)
NRBC BLD-RTO: 0 /100 WBCS (ref 0–0)
PLATELET # BLD AUTO: 209 X10*3/UL (ref 150–450)
POTASSIUM SERPL-SCNC: 4.5 MMOL/L (ref 3.5–5.3)
RBC # BLD AUTO: 5 X10*6/UL (ref 4.5–5.9)
RH FACTOR (ANTIGEN D): NORMAL
SODIUM SERPL-SCNC: 141 MMOL/L (ref 136–145)
WBC # BLD AUTO: 7.5 X10*3/UL (ref 4.4–11.3)

## 2025-05-19 PROCEDURE — 86901 BLOOD TYPING SEROLOGIC RH(D): CPT

## 2025-05-19 PROCEDURE — 87081 CULTURE SCREEN ONLY: CPT

## 2025-05-19 PROCEDURE — 85027 COMPLETE CBC AUTOMATED: CPT

## 2025-05-19 PROCEDURE — 36415 COLL VENOUS BLD VENIPUNCTURE: CPT

## 2025-05-19 PROCEDURE — 99204 OFFICE O/P NEW MOD 45 MIN: CPT

## 2025-05-19 PROCEDURE — 80048 BASIC METABOLIC PNL TOTAL CA: CPT

## 2025-05-19 RX ORDER — CHLORHEXIDINE GLUCONATE 40 MG/ML
SOLUTION TOPICAL DAILY PRN
Qty: 473 ML | Refills: 0 | Status: SHIPPED | OUTPATIENT
Start: 2025-05-19

## 2025-05-19 RX ORDER — CHOLECALCIFEROL (VITAMIN D3) 50 MCG
1 TABLET ORAL DAILY
COMMUNITY
End: 2025-05-19 | Stop reason: SDUPTHER

## 2025-05-19 RX ORDER — CHLORHEXIDINE GLUCONATE ORAL RINSE 1.2 MG/ML
15 SOLUTION DENTAL AS NEEDED
Qty: 120 ML | Refills: 0 | Status: SHIPPED | OUTPATIENT
Start: 2025-05-19 | End: 2025-05-21

## 2025-05-19 ASSESSMENT — DUKE ACTIVITY SCORE INDEX (DASI)
TOTAL_SCORE: 58.2
CAN YOU DO LIGHT WORK AROUND THE HOUSE LIKE DUSTING OR WASHING DISHES: YES
CAN YOU DO MODERATE WORK AROUND THE HOUSE LIKE VACUUMING, SWEEPING FLOORS OR CARRYING GROCERIES: YES
CAN YOU DO HEAVY WORK AROUND THE HOUSE LIKE SCRUBBING FLOORS OR LIFTING AND MOVING HEAVY FURNITURE: YES
CAN YOU PARTICIPATE IN STRENOUS SPORTS LIKE SWIMMING, SINGLES TENNIS, FOOTBALL, BASKETBALL, OR SKIING: YES
CAN YOU WALK A BLOCK OR TWO ON LEVEL GROUND: YES
CAN YOU RUN A SHORT DISTANCE: YES
CAN YOU PARTICIPATE IN MODERATE RECREATIONAL ACTIVITIES LIKE GOLF, BOWLING, DANCING, DOUBLES TENNIS OR THROWING A BASEBALL OR FOOTBALL: YES
CAN YOU HAVE SEXUAL RELATIONS: YES
CAN YOU WALK INDOORS, SUCH AS AROUND YOUR HOUSE: YES
CAN YOU CLIMB A FLIGHT OF STAIRS OR WALK UP A HILL: YES
CAN YOU DO YARD WORK LIKE RAKING LEAVES, WEEDING OR PUSHING A MOWER: YES
CAN YOU TAKE CARE OF YOURSELF (EAT, DRESS, BATHE, OR USE TOILET): YES
DASI METS SCORE: 9.9

## 2025-05-19 ASSESSMENT — ENCOUNTER SYMPTOMS
LIGHT-HEADEDNESS: 0
NAUSEA: 0
RHINORRHEA: 0
CONSTIPATION: 0
POLYDIPSIA: 0
VOMITING: 0
EXCESSIVE BLEEDING: 0
SKIN CHANGES: 0
COUGH: 0
NECK SWELLING: 0
NECK MASS: 0
DYSPNEA WITH EXERTION: 0
VOICE CHANGE: 0
CONFUSION: 0
DOUBLE VISION: 0
BRUISES/BLEEDS EASILY: 0
NECK PAIN: 0
LIMITED RANGE OF MOTION: 0
VISUAL CHANGE: 0
SINUS CONGESTION: 0
DIFFICULTY URINATING: 0
JOINT SWELLING: 0
TROUBLE SWALLOWING: 0
DIARRHEA: 0
ABDOMINAL DISTENTION: 0
MYALGIAS: 0
ABDOMINAL PAIN: 0
BLOOD IN STOOL: 0
NUMBNESS: 0
WOUND: 0
EYE DISCHARGE: 0
ARTHRALGIAS: 0
PALPITATIONS: 0
NERVOUS/ANXIOUS: 0
TREMORS: 0
VERTIGO: 0
DYSURIA: 0
WHEEZING: 0
FEVER: 0
CHILLS: 0
UNEXPECTED WEIGHT CHANGE: 0
WEAKNESS: 0
PND: 0
NECK STIFFNESS: 0
DYSPNEA AT REST: 0
HEMOPTYSIS: 0
SHORTNESS OF BREATH: 0

## 2025-05-19 ASSESSMENT — LIFESTYLE VARIABLES: SMOKING_STATUS: NONSMOKER

## 2025-05-19 ASSESSMENT — PAIN SCALES - GENERAL: PAINLEVEL_OUTOF10: 0 - NO PAIN

## 2025-05-19 ASSESSMENT — PAIN - FUNCTIONAL ASSESSMENT: PAIN_FUNCTIONAL_ASSESSMENT: 0-10

## 2025-05-19 NOTE — PREPROCEDURE INSTRUCTIONS
Thank you for visiting [facility] ([facility]) today for your pre-procedure evaluation, you were seen by     Hyun Oconnor PA-C   Department of Anesthesiology and Perioperative Medicine  Main phone [phone]  Fax [phone]    This summary includes instructions and information to aid you during your perioperative period.  Please read carefully. If you have any questions about your visit today, please call the number listed above.  If you become ill or have any changes to your health before your surgery, please contact your primary care provider and alert your surgeon.    General Medications Instructions (see back for further medication instructions)  Hold Vit D supplementation day of surgery  Hold all other vitamins and supplements 7 days prior to surgery  Tylenol okay to continue, please hold Aleve/naproxen/ibuprofen (NSAIDs) for 7 days prior to surgery  No lotion/moisturizers or Deoderant after last shower prior to surgery    You will be called business day prior to surgery to confirm arrival time.     Preparing for Surgery       Preoperative Fasting Guidelines  Why must I stop eating and drinking near surgery time?  With sedation, food or liquid in your stomach can enter your lungs causing serious complications  Food can increase nausea and vomiting  When do I need to stop eating and drinking before my surgery?  Do not eat any food after midnight the night before your surgery/procedure.  You may have up to 13.5 ounces of clear liquid until TWO hours before your instructed arrival time to the hospital.  This includes water, black tea/coffee, (no milk or cream) apple juice, and electrolyte drinks (Gatorade)  You may chew gum until TWO hours before your surgery/procedure    Tobacco and Alcohol;  Do not drink alcohol or smoke within 24 hours of surgery.  It is best to quit smoking for as long as possible before any surgery or procedure.       Other Instructions  Why did I have my nose,  "under my arms, and groin swabbed? The purpose of the swab is to identify Staphylococcus aureus inside your nose or on your skin.  The swab was sent to the laboratory for culture.  A positive swab/culture for Staphylococcus aureus is called colonization or carriage.   What is Staphylococcus aureus? Staphylococcus aureus, also known as \"staph\", is a germ found on the skin or in the nose of healthy people.  Sometimes Staphylococcus aureus can get into the body and cause an infection.  This can be minor (such as pimples, boils, or other skin problems).  It might also be serious (such as a blood infection, pneumonia, or a surgical site infection). What is Staphylococcus aureus colonization or carriage? Colonization or carriage means that a person has the germ but is not sick from it.  These bacteria can be spread on the hands or when breathing or sneezing. How is Staphylococcus aureus spread? It is most often spread by close contact with a person or item that carries it. What happens if my culture is positive for Staphylococcus aureus? Your doctor/medical team will use this information to guide any antibiotic treatment which may be necessary.  Regardless of the culture results, we will clean the inside of your nose with a betadine swab just before you have your surgery. Will I get an infection if I have Staphylococcus aureus in my nose or on my skin? Anyone can get an infection with Staphylococcus aureus.  However, the best way to reduce your risk of infection is to follow the instructions provided to you for the use of your CHG soap and dental rinse.  , Body Wash; What is a home preoperative antibacterial shower? This shower is a way of cleaning the skin with a germ-killing solution before surgery.  The solution contains chlorhexidine, commonly known as CHG.  CHG is a skin cleanser with germ-killing ability.  Let your doctor know if you are allergic to chlorhexidine. Why do I need to take a preoperative antibacterial " shower? Skin is not sterile.  It is best to try to make your skin as free of germs as possible before surgery.  Proper cleansing with a germ-killing soap before surgery can lower the number of germs on your skin.  This helps to reduce the risk of infection at the surgical site.  Following the instructions listed below will help you prepare your skin for surgery.   How do I use the solution? Steps:  Begin using your CHG soap 5 days before your scheduled surgery on ___________.   First, wash and rinse your hair using the CHG soap. Keep CHG soap away from ear canals and eyes.  Rinse completely, do not condition.  Hair extensions should be removed. , Oral/Dental Rinse: What is oral/dental rinse?  It is a mouthwash. It is a way of cleaning the mouth with a germ-killing solution before your surgery.  The solution contains chlorhexidine, commonly known as CHG. It is used inside the mouth to kill a bacteria known as Staphylococcus aureus.  Let your doctor know if you are allergic to Chlorhexidine. Why do I need to use CHG oral/dental rinse? The CHG oral/dental rinse helps to kill a bacteria in your mouth known as Staphylococcus aureus.  This reduces the risk of infection at the surgical site.  Using your CHG oral/dental rinse STEPS: Use your CHG oral/dental rinse after you brush your teeth the night before (at bedtime) and the morning of your surgery.  Follow all directions on your prescription label.  Use the cap on the container to measure 15 ml.  Swish (gargle if you can) the mouthwash in your mouth for at least 30 seconds, (do not swallow) and spit out.  After you use your CHG rinse, do not rinse your mouth with water, drink or eat.  Please refer to the prescription label for the appropriate time to resume oral intake What side effects might I have using the CHG oral/dental rinse? CHG rinse will stick to plaque on the teeth.  Brush and floss just before use.  Teeth brushing will help avoid staining of plaque during use.        The Week before Surgery        Seven days before Surgery  Check your [facility] medication instructions  Do the exercises provided to you by [facility]   Arrange for a responsible, adult licensed  to take you home after surgery and stay with you for 24 hours.  You will not be permitted to drive yourself home if you have received any anesthetic/sedation  Five days before surgery  Check your [facility] medication instructions  Do the exercises provided to you by [facility]   Start using Chlorhexidene (CHG) body wash if prescribed (Continue till day of surgery)      The Day before Surgery       Check your [facility] medication and all other [facility] instructions including when to stop eating and drinking  You will be called with your arrival time for surgery in the late afternoon.  If you do not receive a call please reach out to your surgeon's office.  Do not smoke or drink 24 hours before surgery  Prepare items to bring with you to the hospital  Shower with your chlorhexidine wash if prescribed  Brush your teeth and use your chlorhexidine dental rinse if prescribed    The Day of Surgery       Check your [facility] medication instructions  Shower, if prescribed use CHG.  Do not apply any lotions, creams, moisturizers, perfume or deodorant  Brush your teeth and use your CHG dental rinse if prescribed  Wear loose comfortable clothing  Avoid make-up  Remove  jewelry and piercings, consider professional piercing removal with a plastic spacer if needed  Bring photo ID and Insurance card  Bring an accurate medication list that includes medication dose, frequency and allergies  Bring a copy of your advanced directives (will, health care power of )  Bring any devices and controllers as well as medical devices you have been provided with for surgery (CPAP, slings, braces, etc.)  Dentures, eyeglasses, and contacts will be removed before surgery, please bring cases for contacts or glasses    Preoperative Deep Breathing Exercises    Why it is important to  do deep breathing exercises before my surgery?  Deep breathing exercises strengthen your breathing muscles.  This helps you to recover after your surgery and decreases the chance of breathing complications.    How are the deep breathing exercises done?  Sit straight with your back supported.  Breathe in deeply and slowly through your nose. Your lower rib cage should expand and your abdomen may move forward.  Hold that breath for 3 to 5 seconds.  Breathe out through pursed lips, slowly and completely.  Rest and repeat 10 times every hour while awake.  Rest longer if you become dizzy or lightheaded.    Preoperative Brain Exercises    What are brain exercises?  A brain exercise is any activity that engages your thinking (cognitive) skills.    What types of activities are considered brain exercises?  Jigsaw puzzles, crossword puzzles, word jumble, memory games, word search, and many more.  Many can be found free online or on your phone via a mobile bill.    Why should I do brain exercises before my surgery?  More recent research has shown brain exercise before surgery can lower the risk of postoperative delirium (confusion) which can be especially important for older adults.  Patients who did brain exercises for 5 to 10 hours the days before surgery, cut their risk of postoperative delirium in half up to 1 week after surgery.    Sit-to-Stand Exercise    What is the sit-to-stand exercise?  The sit-to-stand exercise strengthens the muscles of your lower body and muscles in the center of your body (core muscles for stability) helping to maintain and improve your strength and mobility.  How do I do the sit-to-stand exercise?  The goal is to do this exercise without using your arms or hands.  If this is too difficult, use your arms and hands or a chair with armrests to help slowly push yourself to the standing position and lower yourself back to the sitting position. As the movement becomes easier use your arms and hands  less.    Steps to the sit-to-stand exercise  Sit up tall in a sturdy chair, knees bent, feet flat on the floor shoulder-width apart.  Shift your hips/pelvis forward in the chair to correctly position yourself for the next movement.  Lean forward at your hips.  Stand up straight putting equal weight on both feet.  Check to be sure you are properly aligned with the chair, in a slow controlled movement sit back down.  Repeat this exercise 10-15 times.  If needed you can do it fewer times until your strength improves.  Rest for 1 minute.  Do another 10-15 sit-to-stand exercises.  Try to do this in the morning and evening.       Simple things you can do to help prevent blood clots     Blood clots are blockages that can form in the body's veins. When a blood clot forms in your deep veins, it may be called a deep vein thrombosis, or DVT for short. Blood clots can happen in any part of the body where blood flows, but they are most common in the arms and legs. If a piece of a blood clot breaks free and travels to the lungs, it is called a pulmonary embolus (PE). A PE can be a very serious problem.      Being in the hospital or having surgery can raise your chances of getting a blood clot because you may not be well enough to move around as much as you normally do.         Ways you can help prevent blood clots in the hospital       Wearing SCDs  SCDs stands for Sequential Compression Devices.   SCDs are special sleeves that wrap around your legs. They attach to a pump that fills them with air to gently squeeze your legs every few minutes.  This helps return the blood in your legs to your heart.   SCDs should only be taken off when walking or bathing. SCDs may not be comfortable, but they can help save your life.              Pump SCD leg sleeves  Wearing compression stockings - if your doctor orders them. These special snug-fitting stockings gently squeeze your legs to help blood flow.       Walking. Walking helps move the  blood in your legs.   If your doctor says it is ok, try walking the halls at least   5 times a day. Ask us to help you get up, so you don't fall.      Taking any blood-thinning medicines your doctor orders.              Ways you can help prevent blood clots at home         Wearing compression stockings - if your doctor orders them.   Walking - to help move the blood in your legs.    Taking any blood-thinning medicines your doctor orders.      Signs of a blood clot or PE    Tell your doctor or nurse right away if you have any of the problems listed below.         If you are at home, seek medical care right away. Call 911 for chest pain or problems breathing.            Signs of a blood clot (DVT) - such as pain, swelling, redness, or warmth in your arm or legs.  Signs of a pulmonary embolism (PE) - such as chest pain or feeling short of breath

## 2025-05-19 NOTE — NURSING NOTE
Patient seen in PAT. Orders reviewed with PAT Provider.     Following labs obtained by documenting RN:   MRSA SWAB, T/S, BMP, CBC      Patient discharged with: Pre-procedure instructions/AVS.    Mayte BRADLEYN, RN  [facility]   [facility]

## 2025-05-19 NOTE — CPM/PAT H&P
CPM/PAT Evaluation       Name: Patricio Ayers (Patricio Ayers)  /Age: 1985/39 y.o.     Visit Type:   In-Person       Chief Complaint: perioperative evaluation    HPI HPI: 40 y/o male scheduled for Repair Ventral Hernia Robot-Assisted, Repair Robot-Assisted Abdominal Wall - Bilateral on 2025  secondary to Ventral hernia with obstruction and without gangrene  with Dr. Palomo Ureña who referred to [facility].  Presents to [facility] today for perioperative risk stratification and optimization. PMHX includes Ventral hernia with obstruction and without gangrene s/p past repairs and small bowel resection.       Specialists:   General Surgery - Palomo Ureña MD .          Medical History[1]    Surgical History[2]    Patient Sexual activity questions deferred to the physician.    Family History[3]    Allergies[4]    Prior to Admission medications    Medication Sig Start Date End Date Taking? Authorizing Provider   cholecalciferol, vitamin D3, (D3-2000 ORAL) Take 2,000 Units by mouth once daily.    Historical Provider, MD   ibuprofen 200 mg tablet Take 1 tablet (200 mg) by mouth every 6 hours if needed for mild pain (1 - 3).    Historical Provider, MD   omega 3-dha-epa-fish oil (Fish OiL) 1,000 mg (120 mg-180 mg) capsule Take 1 capsule (1,000 mg) by mouth once daily.    Historical Provider, MD YOUNG ROS:   Constitutional:    no fever   no chills   no unexpected weight change  Neuro/Psych:    no numbness   no weakness   no light-headedness   no tremors   no confusion   not nervous/anxious   no self-injury   no suicidal ideation  Eyes:    no discharge   no vision loss   no diplopia   no visual disturbance   no corrective lenses  Ears:    no ear pain   no hearing loss   no vertigo   no tinnitus   no hearing aides  Nose:    no nasal discharge   no sinus congestion   no epistaxis  Mouth:    no dental issues   no mouth pain   no oral bleeding   no mouth lesions  Throat:    no throat pain   no dysphagia   no voice  change  Neck:    no neck pain   neck swelling   no neck stiffness   no neck masses  Cardio:    no chest pain   no palpitations   no peripheral edema   no dyspnea   no HELLER   no paroxysmal nocturnal dyspnea  Respiratory:    no cough   no wheezing   no hemoptysis   no shortness of breath  Endocrine:    no cold intolerance   no heat intolerance   no polydipsia  GI:    Reports hernia present   no abdominal distention   no abdominal pain   no constipation   no diarrhea   no nausea   no vomiting   no blood in stool  :    no difficulty urinating   no dysuria   no oliguria   no polyuria  Musculoskeletal:    no arthralgias   no myalgias   no decreased ROM   no swelling  Hematologic:    does not bruise/bleed easily   no excessive bleeding   no history of blood transfusion   no blood clots  Skin:   no skin changes   no sores/wound   no rash      Physical Exam  Vitals reviewed.   Constitutional:       General: He is not in acute distress.     Appearance: Normal appearance. He is obese. He is not ill-appearing, toxic-appearing or diaphoretic.   HENT:      Head: Normocephalic.      Nose: Nose normal. No congestion or rhinorrhea.      Mouth/Throat:      Mouth: Mucous membranes are moist.      Pharynx: Oropharynx is clear. No oropharyngeal exudate or posterior oropharyngeal erythema.   Eyes:      General: No scleral icterus.        Right eye: No discharge.         Left eye: No discharge.      Conjunctiva/sclera: Conjunctivae normal.   Neck:      Vascular: No carotid bruit.   Cardiovascular:      Rate and Rhythm: Normal rate and regular rhythm.      Pulses: Normal pulses.      Heart sounds: Normal heart sounds. No murmur heard.     No friction rub. No gallop.   Pulmonary:      Effort: Pulmonary effort is normal. No respiratory distress.      Breath sounds: Normal breath sounds. No stridor. No wheezing, rhonchi or rales.   Chest:      Chest wall: No tenderness.   Abdominal:      Hernia: A hernia is present.   Musculoskeletal:         " General: No swelling or tenderness.      Cervical back: Normal range of motion. No rigidity or tenderness.   Neurological:      General: No focal deficit present.      Mental Status: He is alert.   Psychiatric:         Mood and Affect: Mood normal.         Behavior: Behavior normal.         Thought Content: Thought content normal.         Judgment: Judgment normal.          PAT AIRWAY:   Airway:     Mallampati::  IV    TM distance::  >3 FB    Neck ROM::  Full   Few broken/chipped teeth           Visit Vitals  /87   Pulse 80   Temp 36.4 °C (97.6 °F) (Temporal)   Ht 1.88 m (6' 2\")   Wt 138 kg (304 lb)   SpO2 96%   BMI 39.03 kg/m²   Smoking Status Never   BSA 2.68 m²       DASI Risk Score      Flowsheet Row Pre-Admission Testing from 5/19/2025 in [facility]   Can you take care of yourself (eat, dress, bathe, or use toilet)?  2.75 filed at 05/19/2025 1535   Can you walk indoors, such as around your house? 1.75 filed at 05/19/2025 1535   Can you walk a block or two on level ground?  2.75 filed at 05/19/2025 1535   Can you climb a flight of stairs or walk up a hill? 5.5 filed at 05/19/2025 1535   Can you run a short distance? 8 filed at 05/19/2025 1535   Can you do light work around the house like dusting or washing dishes? 2.7 filed at 05/19/2025 1535   Can you do moderate work around the house like vacuuming, sweeping floors or carrying groceries? 3.5 filed at 05/19/2025 1535   Can you do heavy work around the house like scrubbing floors or lifting and moving heavy furniture?  8 filed at 05/19/2025 1535   Can you do yard work like raking leaves, weeding or pushing a mower? 4.5 filed at 05/19/2025 1535   Can you have sexual relations? 5.25 filed at 05/19/2025 1535   Can you participate in moderate recreational activities like golf, bowling, dancing, doubles tennis or throwing a baseball or football? 6 filed at 05/19/2025 153   Can you participate in strenous sports like swimming, singles tennis, " football, basketball, or skiing? 7.5 filed at 05/19/2025 1535   DASI SCORE 58.2 filed at 05/19/2025 1535   METS Score (Will be calculated only when all the questions are answered) 9.9 filed at 05/19/2025 1535          Caprini DVT Assessment      Flowsheet Row Pre-Admission Testing from 5/19/2025 in [facility]   DVT Score (IF A SCORE IS NOT CALCULATING, MUST SELECT A BMI TO COMPLETE) 8 filed at 05/19/2025 1554   Surgical Factors Major surgery planned, lasting over 3 hours filed at 05/19/2025 1554   BMI (BMI MUST BE CHOSEN) 31-40 (Obesity) filed at 05/19/2025 1554          Modified Frailty Index    No data to display       [id number] Stroke Risk Points  Current as of just now        N/A 0 to 9 Points:      Last Change: N/A          The [id number] risk score (Lip MYRIAM, et al. 2009. © 2010 [employer]) quantifies the risk of stroke for a patient with atrial fibrillation. For patients without atrial fibrillation or under the age of 18 this score appears as N/A. Higher score values generally indicate higher risk of stroke.        This score is not applicable to this patient. Components are not calculated.          Revised Cardiac Risk Index      Flowsheet Row Pre-Admission Testing from 5/19/2025 in [facility]   High-Risk Surgery (Intraperitoneal, Intrathoracic,Suprainguinal vascular) 1 filed at 05/19/2025 1554   History of ischemic heart disease (History of MI, History of positive exercuse test, Current chest paint considered due to myocardial ischemia, Use of nitrate therapy, ECG with pathological Q Waves) 0 filed at 05/19/2025 1554   History of congestive heart failure (pulmonary edemia, bilateral rales or S3 gallop, Paroxysmal nocturnal dyspnea, CXR showing pulmonary vascular redistribution) 0 filed at 05/19/2025 1554   History of cerebrovascular disease (Prior TIA or stroke) 0 filed at 05/19/2025 1554   Pre-operative insulin treatment 0 filed at 05/19/2025  1554   Pre-operative creatinine>2 mg/dl 0 filed at 05/19/2025 1554   Revised Cardiac Risk Calculator 1 filed at 05/19/2025 1554          Apfel Simplified Score      Flowsheet Row Pre-Admission Testing from 5/19/2025 in [facility]   Smoking status 1 filed at 05/19/2025 1555   History of motion sickness or PONV  0 filed at 05/19/2025 1555   Use of postoperative opioids 1 filed at 05/19/2025 1555   Gender - Female 0=No filed at 05/19/2025 1555   Apfel Simplified Score Calculator 2 filed at 05/19/2025 1555          Risk Analysis Index Results This Encounter    No data found in the last 10 encounters.       Stop Bang Score      Flowsheet Row Pre-Admission Testing from 5/19/2025 in [facility]   Do you snore loudly? 1 filed at 05/19/2025 1536   Do you often feel tired or fatigued after your sleep? 0 filed at 05/19/2025 1536   Has anyone ever observed you stop breathing in your sleep? 0 filed at 05/19/2025 1536   Do you have or are you being treated for high blood pressure? 0 filed at 05/19/2025 1536   Recent BMI (Calculated) 39.3 filed at 05/19/2025 1536   Is BMI greater than 35 kg/m2? 1=Yes filed at 05/19/2025 1536   Age older than 50 years old? 0=No filed at 05/19/2025 1536   Is your neck circumference greater than 17 inches (Male) or 16 inches (Female)? 0 filed at 05/19/2025 1536   Gender - Male 1=Yes filed at 05/19/2025 1536   STOP-BANG Total Score 3 filed at 05/19/2025 1536          Prodigy: High Risk  Total Score: 8              Prodigy Gender Score          ARISCAT Score for Postoperative Pulmonary Complications      Flowsheet Row Pre-Admission Testing from 5/19/2025 in [facility]   Age Calculated Score 0 filed at 05/19/2025 1554   Preoperative SpO2 0 filed at 05/19/2025 1554   Respiratory infection in the last month Either upper or lower (i.e., URI, bronchitis, pneumonia), with fever and antibiotic treatment 0 filed at 05/19/2025 1554   Preoperative anemia (Hgb  less than 10 g/dl) 0 filed at 05/19/2025 1554   Surgical incision  15 filed at 05/19/2025 1554   Duration of surgery  23 filed at 05/19/2025 1554   Emergency Procedure  0 filed at 05/19/2025 1554   ARISCAT Total Score  38 filed at 05/19/2025 1554          Júnior Perioperative Risk for Myocardial Infarction or Cardiac Arrest (JOSH)    No data to display         Assessment and Plan:   Anesthesia/Airway:  No anesthesia complications        Neuro:    No neurologic diagnoses, however, the patient is at an increased risk for post operative delirium secondary to type and duration of surgery.  Preoperative brain exercise educational handout provided to patient.    The patient is at an increased risk for perioperative stroke secondary to general anesthesia and op time >2.5 hours.       HEENT/Airway:    No HEENT diagnosis or significant findings on chart review or clinical presentation and evaluation. No further preoperative testing/intervention indicated at this time.      Cardiovascular:    No CV diagnosis or significant findings on chart review or clinical presentation and evaluation. BP today is 126/87 and denies cardiovascular symptoms. Physical exam is benign. METS is >4 and scheduled for non-cardiac surgery.  No additional preoperative testing is currently indicated.    METS are 9.9    RCRI  1 which is 6% 30 day risk of MACE (risk for cardiac death, nonfatal myocardial infarction, and nonfactal cardiac arrest    JOSH score which indicates a   0.2 % risk of intraoperative or 30-day postoperative MACE        Pulmonary:    No Pulmonary diagnosis or significant findings on chart review or clinical presentation and evaluation. Preoperative deep breathing educational handout provided to patient.    No pulmonary diagnosis, however patient is at increased risk of perioperative complications secondary to  obesity, duration of surgery > 2 hours, types of anesthetic    ARISCAT:    38  points which is a intermediate (13.3%) risk of  in-hospital post-op pulmonary complications     PRODIGY:  8  points which is a intermediate risk of post op opioid induced respiratory depression episodes    STOP BANG:  3   points which is a intermediate risk for moderate to severe ADAM    Pumonary toilet education discussed, patient also provided deep breathing exercises and incentive spirometry educational handout      Renal:   No renal diagnosis, however patient is at increase risk for perioperative renal complications secondary to  BMI equal to or greater than 30, intraperitoneal surgery, use of an ace, arb, or NSAID         Endocrine:   No endocrine diagnosis or significant findings on chart review or clinical presentation and evaluation. No further testing or intervention is indicated at this time.      Hematologic/Oncology:      No hematologic diagnosis, however patient is at an increased risk for DVT    Patient confirmed they would accept blood products if necessary.   Preoperative DVT educational handout provided to patient.  Caprini Score:  8  points which is a highest risk of perioperative VTE      Gastrointestinal:   #Ventral hernia with obstruction and without gangrene s/p past repairs and small bowel resection-patient is currently seeking surgical intervention and has history of previous hernia repair pares.  Patient denies any GI/ symptoms.      EAT-10 score of    0  : self-perceived oropharyngeal dysphagia scale (0-40)     Apfel: 2 points 39% risk for post operative N/V      Infectious disease:     No infectious diagnosis or significant findings on chart review or clinical presentation and evaluation.   Prescription provided for CHG body wash and dental rinse. CHG use instructions reviewed and provided to patient.  Staph screen collected      Musculoskeletal:    No diagnosis or significant findings on chart review or clinical presentation and evaluation.       Other:     Hold Vit D supplementation day of surgery  Hold all other vitamins and  supplements 7 days prior to surgery  Tylenol okay to continue, please hold Aleve/naproxen/ibuprofen/Excedrin (NSAIDs) for 7 days prior to surgery  No lotion/moisturizers or Deoderant after last shower prior to surgery        Labs ordered  Recent Results (from the past 3 weeks)   CBC    Collection Time: 05/19/25  3:49 PM   Result Value Ref Range    WBC 7.5 4.4 - 11.3 x10*3/uL    nRBC 0.0 0.0 - 0.0 /100 WBCs    RBC 5.00 4.50 - 5.90 x10*6/uL    Hemoglobin 14.1 13.5 - 17.5 g/dL    Hematocrit 42.2 41.0 - 52.0 %    MCV 84 80 - 100 fL    MCH 28.2 26.0 - 34.0 pg    MCHC 33.4 32.0 - 36.0 g/dL    RDW 12.0 11.5 - 14.5 %    Platelets 209 150 - 450 x10*3/uL   Basic Metabolic Panel    Collection Time: 05/19/25  3:49 PM   Result Value Ref Range    Glucose 139 (H) 74 - 99 mg/dL    Sodium 141 136 - 145 mmol/L    Potassium 4.5 3.5 - 5.3 mmol/L    Chloride 102 98 - 107 mmol/L    Bicarbonate 31 21 - 32 mmol/L    Anion Gap 13 10 - 20 mmol/L    Urea Nitrogen 11 6 - 23 mg/dL    Creatinine 0.59 0.50 - 1.30 mg/dL    eGFR >90 >60 mL/min/1.73m*2    Calcium 10.1 8.6 - 10.6 mg/dL   Type And Screen Is this order related to pregnancy or an upcoming surgery? Yes; Where will this surgery/delivery be performed? [facility]; What is the date of the surgery? 6/2/2025; Has this patient ever had a transfusion? No; Has th...    Collection Time: 05/19/25  3:49 PM   Result Value Ref Range    ABO TYPE A     Rh TYPE NEG     ANTIBODY SCREEN NEG    Staphylococcus aureus/MRSA colonization, Culture    Collection Time: 05/19/25  3:49 PM    Specimen: Nares/Axilla/Groin; Swab   Result Value Ref Range    Staph/MRSA Screen Culture No Staphylococcus aureus isolated            Hyun Oconnor PA-C     Medication instructions and NPO guidelines reviewed with the patient.  All questions or concerns discussed and addressed.   The above clinical summary has been dictated with voice recognition software. It has not been proofread for grammatical errors,  typographical mistakes, or other semantic inconsistencies.   All labs/imaging included on this documentation were reviewed/considered in medical decision making for perioperative planning, including labs ordered day of CPM appointment.             [1]   Past Medical History:  Diagnosis Date    Ventral hernia    [2]   Past Surgical History:  Procedure Laterality Date    HERNIA REPAIR  02/11/2023    Open primary hernia repair of incarcerated anterior abdominal wall hernia    SMALL INTESTINE SURGERY  09/11/2024   [3]   Family History  Problem Relation Name Age of Onset    No Known Problems Mother      No Known Problems Father     [4] No Known Allergies

## 2025-05-20 LAB — STAPHYLOCOCCUS SPEC CULT: NORMAL

## 2025-05-28 ENCOUNTER — TELEPHONE (OUTPATIENT)
Dept: SURGERY | Facility: CLINIC | Age: 40
End: 2025-05-28
Payer: COMMERCIAL

## 2025-08-25 DIAGNOSIS — K43.6 VENTRAL HERNIA WITH OBSTRUCTION AND WITHOUT GANGRENE: ICD-10-CM

## (undated) DEVICE — SUTURE, PDS II, 0 36 IN, CT-1, VIOLET

## (undated) DEVICE — LIGASURE IMPACT, 18CM

## (undated) DEVICE — Device

## (undated) DEVICE — SUTURE, PDS II, 0, CTX VIL MONO, 36IN, LF

## (undated) DEVICE — SPONGE, LAP, XRAY DECT, 18IN X 18IN, W/LOOP, STERILE

## (undated) DEVICE — SLEEVE, KII, Z-THREAD, 12X100CM

## (undated) DEVICE — SUTURE, VICRYL, 3-0,18 IN, SH, UNDYED

## (undated) DEVICE — SOLUTION, IRRIGATION, X RX SODIUM CHL 0.9%, 1000ML BTL

## (undated) DEVICE — DRESSING, MEPILEX BORDER, POST-OP AG, 4 X 8 IN

## (undated) DEVICE — CARE KIT, LAPAROSCOPIC, ADVANCED

## (undated) DEVICE — SOLUTION, INJECTION, USP, LACTATED RINGERS, LIFECARE, 1000ML

## (undated) DEVICE — SYRINGE, 10 CC, LUER LOCK

## (undated) DEVICE — CUTTER, PROXIMATE LINEAR RELOAD, 75MM, BLUE

## (undated) DEVICE — TROCAR, KII OPTICAL BLADELESS 5MM Z THREAD 100MM LNGTH

## (undated) DEVICE — SLEEVE, KII, Z-THREAD, 5X100CM

## (undated) DEVICE — GLOVE, SURGICAL, PROTEXIS PI , 7.0, PF, LF

## (undated) DEVICE — CUTTER, PROX LINEAR, 75MM, REG TISSUE, W/ SAFETY LOCK OUT

## (undated) DEVICE — SUTURE, SILK, 2-0, 30 IN, SH, CONTROL RELEASE, MULTIPACK, BLACK

## (undated) DEVICE — LIGASURE, L-HOOK 37CM SEALER/DIVIDER LAP, MARYLAND

## (undated) DEVICE — DRAPE, TIBURON W/ADHESIVE, 19 X 30

## (undated) DEVICE — SUTURE, MONOCRYL, 4-0, 27 IN, PS-2, UNDYED

## (undated) DEVICE — NEEDLE, HYPODERMIC, MONOJECT, 25 G X 1.5 IN, LUER LOCK HUB, RED

## (undated) DEVICE — GOWN, SURGICAL, SIRUS, NON REINFORCED, LARGE

## (undated) DEVICE — STAPLER, SKIN, DISPOSABLE, 35 COUNT, WIDE, STERILE

## (undated) DEVICE — SUTURE, ETHIBOND, XTRA, 30 IN, 0, CT-2, GREEN